# Patient Record
Sex: FEMALE | Race: WHITE | NOT HISPANIC OR LATINO | Employment: PART TIME | URBAN - METROPOLITAN AREA
[De-identification: names, ages, dates, MRNs, and addresses within clinical notes are randomized per-mention and may not be internally consistent; named-entity substitution may affect disease eponyms.]

---

## 2022-01-19 ENCOUNTER — TRANSFERRED RECORDS (OUTPATIENT)
Dept: HEALTH INFORMATION MANAGEMENT | Facility: CLINIC | Age: 32
End: 2022-01-19
Payer: COMMERCIAL

## 2022-01-25 ENCOUNTER — TRANSFERRED RECORDS (OUTPATIENT)
Dept: HEALTH INFORMATION MANAGEMENT | Facility: CLINIC | Age: 32
End: 2022-01-25
Payer: COMMERCIAL

## 2022-01-28 ENCOUNTER — TRANSCRIBE ORDERS (OUTPATIENT)
Dept: MATERNAL FETAL MEDICINE | Facility: CLINIC | Age: 32
End: 2022-01-28

## 2022-01-28 DIAGNOSIS — O26.90 PREGNANCY RELATED CONDITION, ANTEPARTUM: Primary | ICD-10-CM

## 2022-01-31 ENCOUNTER — PRE VISIT (OUTPATIENT)
Dept: MATERNAL FETAL MEDICINE | Facility: CLINIC | Age: 32
End: 2022-01-31
Payer: COMMERCIAL

## 2022-02-01 ENCOUNTER — HOSPITAL ENCOUNTER (OUTPATIENT)
Dept: ULTRASOUND IMAGING | Facility: CLINIC | Age: 32
End: 2022-02-01
Payer: COMMERCIAL

## 2022-02-01 ENCOUNTER — OFFICE VISIT (OUTPATIENT)
Dept: MATERNAL FETAL MEDICINE | Facility: CLINIC | Age: 32
End: 2022-02-01
Payer: COMMERCIAL

## 2022-02-01 DIAGNOSIS — O44.10: ICD-10-CM

## 2022-02-01 DIAGNOSIS — O26.90 PREGNANCY RELATED CONDITION, ANTEPARTUM: ICD-10-CM

## 2022-02-01 DIAGNOSIS — O43.212 PLACENTA ACCRETA IN SECOND TRIMESTER: Primary | ICD-10-CM

## 2022-02-01 PROCEDURE — 76817 TRANSVAGINAL US OBSTETRIC: CPT | Mod: 26 | Performed by: OBSTETRICS & GYNECOLOGY

## 2022-02-01 PROCEDURE — 76811 OB US DETAILED SNGL FETUS: CPT

## 2022-02-01 PROCEDURE — 76811 OB US DETAILED SNGL FETUS: CPT | Mod: 26 | Performed by: OBSTETRICS & GYNECOLOGY

## 2022-02-01 PROCEDURE — 99204 OFFICE O/P NEW MOD 45 MIN: CPT | Mod: 25 | Performed by: OBSTETRICS & GYNECOLOGY

## 2022-02-01 NOTE — PROGRESS NOTES
The patient was seen for an ultrasound in the M[facility]etal [facility] at the [facility] today.  For a detailed report of the ultrasound examination, please see the ultrasound report which can be found under the imaging tab.    Corazon Hook MD  , OB/GYN  Maternal-Fetal Medicine  [phone] (Pager)

## 2022-02-01 NOTE — NURSING NOTE
RN coordinator introduced self and provided contact information for patient. Plan for ASHOK at approximately 28 weeks.     Madalyn Bermudez RN

## 2022-02-09 ENCOUNTER — HOSPITAL ENCOUNTER (OUTPATIENT)
Dept: ULTRASOUND IMAGING | Facility: CLINIC | Age: 32
End: 2022-02-09
Attending: OBSTETRICS & GYNECOLOGY
Payer: COMMERCIAL

## 2022-02-09 ENCOUNTER — TRANSCRIBE ORDERS (OUTPATIENT)
Dept: MATERNAL FETAL MEDICINE | Facility: CLINIC | Age: 32
End: 2022-02-09

## 2022-02-09 ENCOUNTER — OFFICE VISIT (OUTPATIENT)
Dept: MATERNAL FETAL MEDICINE | Facility: CLINIC | Age: 32
End: 2022-02-09
Attending: OBSTETRICS & GYNECOLOGY
Payer: COMMERCIAL

## 2022-02-09 DIAGNOSIS — O43.212 PLACENTA ACCRETA IN SECOND TRIMESTER: Primary | ICD-10-CM

## 2022-02-09 DIAGNOSIS — O44.10: ICD-10-CM

## 2022-02-09 PROCEDURE — 76817 TRANSVAGINAL US OBSTETRIC: CPT | Mod: 26 | Performed by: OBSTETRICS & GYNECOLOGY

## 2022-02-09 PROCEDURE — 76817 TRANSVAGINAL US OBSTETRIC: CPT

## 2022-02-09 NOTE — NURSING NOTE
Bhumi here with her . Dr. Glover in to see pt.   Pt to have f/u comp and 1st obv on 3/3. Pt scheduled and left amb and stable. Madalyn Trent RN

## 2022-02-16 ENCOUNTER — TRANSFERRED RECORDS (OUTPATIENT)
Dept: HEALTH INFORMATION MANAGEMENT | Facility: CLINIC | Age: 32
End: 2022-02-16
Payer: COMMERCIAL

## 2022-02-21 NOTE — PROGRESS NOTES
"Please see \"Imaging\" tab under \"Chart Review\" for details of today's US at the [facility].    Nick Glover MD  Maternal-Fetal Medicine      "

## 2022-02-28 NOTE — PROGRESS NOTES
MFM OB INITIAL VISIT    Patient Information:   Bhumi Lorenzo   : 1990  MRN: [id number]    Consultation requested by: Naomi Rivera MD   Reason for consultation: Initiation of Prenatal Care      Presentation History:   Bhumi Lorenzo is a 32 year old  at 27w1d by 7w6d ultrasound presenting for her initial appointment with our practice.    The patient presents in good health - she has been feeling well.  No cramping/contractions, no LOF, no VB.  Reports good FM.  No headaches, vision changes, chest pain or shortness of breath, RUQ/epigastric pain, or worsening extremity edema. All other ROS negative.    Problem List:     There are no problems to display for this patient.    Review of Allergies/Medical History/Medications:     Obstetrical History:    OB History    Para Term  AB Living   5 3 3 0 1 3   SAB IAB Ectopic Multiple Live Births   1 0 0 0 3      # Outcome Date GA Lbr Travis/2nd Weight Sex Delivery Anes PTL Lv   5 Current            4 SAB      SAB      3 Term         ARNOLDO   2 Term         ARNOLDO   1 Term         ARNOLDO     Medical History:  No past medical history on file.    Surgical History:  No past surgical history on file.    Medications:   No current outpatient medications on file.     Allergies: NKDA      Social History:    She denies tobacco, alcohol, or other illicit drug use prior to or during pregnancy.     Family History:  No family history on file.    Ethnicity:      Denies a family history of motor/intellectual impairment, stillbirth, genetic or chromosome abnormalities or congenital anomalies.       Review of Systems:       Constitutional - denies fevers or chills, no recent illness.    Eyes - denies blurry vision, visual changes, eye pain.     ENT - denies sore throat and nasal congestion, no hearing problems.    Cardiovascular - denies shortness of breath, palpitations or chest pain.    Respiratory - denies wheezing, no cough, no difficulty  breathing.    Gastrointestinal - normal appetite, denies diarrhea, rectal bleeding.    Genitourinary - denies dysuria, no hematuria.    Musculoskeletal - denies joint pain, no muscle pain.    Dermatologic -  denies rashes, lesions, or dry skin.     Neurologic - denies headache, no dizziness, no seizures.    Psychiatric - denies changes in mood.      Endocrine - denies temperature sensitivity, polydipsia.    Hematologic/Lymphatic - denies excessive bleeding or bruising, no history of DVT.    Physical Exam:     Vitals:  There were no vitals taken for this visit.    GEN: NAD  SKIN: no rash appreciated  BREASTS: no masses, nipple discharge or skin changes  CV: regular, physiologic flow murmur  PULM: clear  ABD: gravid, nontender  EXT: wwp, non-tender, symmetric    Review of Labs/Diagnostics:     Comprehensive Growth US (2/1/2022):   IMPRESSION  ---------------------------------------------------------------------------------------------------------  1. David intrauterine pregnancy at 22w6d gestational age here for evaluation of fetal anatomy and the placenta due to suspected PAS on outside ultrasound.  2. No fetal anomalies commonly detected by ultrasound or soft markers of aneuploidy were identified in the detailed fetal anatomic survey within the limits of prenatal  ultrasound.  3. Growth parameters and estimated fetal weight were consistent with established dates.  4. The amniotic fluid volume appeared normal.  5. On transvaginal imaging the cervix appears closed but is only measuring 29mm in length.  6. The placenta is anterior with a complete placenta previa. 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    Transvaginal US (2/9/22):   Cardiac activity present.  bpm.  Fetal movements visualized.  Presentation  cephalic.  Placenta Posterior, previa.  Umbilical cord normal.  Amniotic fluid Amount of AF: normal. MVP 5.7 cm.  Cervix: Visualized. Appearance: Appears closed. Approach - Transvaginal: Cervical length 25.2 mm. Funneling absent. Cervical cerclage absent.     Please see supervision of high risk pregnancy under problem list for prenatal labs and ultrasound.       Assessment and Plan:   Buhmi Lorenzo is a 32 year old  at 26w5d here for initial prenatal appointment with our practice. Pregnancy is complicated by a history of  section x3, resolved placenta previa with concern for PAS, moderate mitral valve prolapse with regurgitant flow, and COVID in pregnancy.     Placenta Accreta Spectrum   Placenta Previa   - Complete placenta previa noted on comprehensive US performed 22 at 22w1d. Follow-up transvaginal ultrasound on 22 with previa and normal  Cervix length.  - US on  also showed multiple vascular lacunae within the placenta with loss of the hypoechoic zone between the placenta and myometrium. There was also increased subplacental vascularity. 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   - Comprehensive US performed again today, imaging reviewed. Ongoing findings concerning for PAS with multiple large sonolucent spaces with increased bladder edge vascularity.   - Given history of three prior  section, complete previa and PAS features recommendation at this time is to proceed with planned  hysterectomy at 34-35w. Patient states she is satisfied with child-bearing and agreeable with c-hyst. Again discussed with the patient the higher relative risk of including maternofetal compromise, need for transfusion, DIC, bladder and bowel injury.   - Plan for  hysterectomy at 34w GA with vertical abdominal incsion  - Anaesthesia consult at 31-32 weeks.  - Betamethasone with & days of planned delivery    Moderate Mitral Valve Prolapse with Regurgitation   - ECHO 2014 with normal LV  function, EF 60%; mild anterior mitral valve prolapse with mild-to-moderate eccentric mitral regurgitation. Mild TR.   - Per Modified WHO Classification categories, mitral valve prolapse falls within the Class I conditions - these are associated with no detectible increased risk of maternal mortality and no/mild increase in morbidity (Brisa et al., 2018). Mild to moderate mitral regurgitation is well tolerated in pregnancy, while severe mitral regurgitation can be tolerated in pregnancy if there is no left ventricular systolic dysfunction or pulmonary hypertension.   - No specific labor and delivery management necessitated - given low risk cardiac lesion, use of analgesia/anesthesia and Valsalva during delivery not contraindicated.   - Anesthesia consult in 4w.     Relative Shortening of Cervical Length   - Noted to be 25.5mm at 24w0d (22) - closed appearing, no funneling noted at the time.     Routine Prenatal Care   - Rh positive - antibody negative, HIV/RPR/HepB/C non-reactive.   - Pap 10/2021 NILM, HPV negative. Third trimester lab, GCT normal. Tdap given today. Declined flu, COVID series.  - Tdap today  - Declined influenza and COVID  vaccinations     Return to clinic in 4 weeks for ongoing prenatal care. Patient care plan discussed with Dr. Glover.     Kristin Reveles MD   OB/GYN PGY-2  Maternal Fetal Medicine Service   22 11:12 AM    Physician Attestation   I, Nick Glover MD, saw this patient and agree with the findings and plan of care as documented in the note.      Items personally reviewed/procedural attestation: History and extensive counseling on planned  hysterectomy.    Nick Glover MD      Total time spent in all patient care activities was 30 minutes.    Nick Glover MD  Maternal-Fetal Medicine

## 2022-03-03 ENCOUNTER — OFFICE VISIT (OUTPATIENT)
Dept: MATERNAL FETAL MEDICINE | Facility: CLINIC | Age: 32
End: 2022-03-03
Attending: OBSTETRICS & GYNECOLOGY
Payer: COMMERCIAL

## 2022-03-03 ENCOUNTER — HOSPITAL ENCOUNTER (OUTPATIENT)
Dept: ULTRASOUND IMAGING | Facility: CLINIC | Age: 32
End: 2022-03-03
Attending: OBSTETRICS & GYNECOLOGY
Payer: COMMERCIAL

## 2022-03-03 VITALS
WEIGHT: 166.4 LBS | SYSTOLIC BLOOD PRESSURE: 98 MMHG | OXYGEN SATURATION: 100 % | HEART RATE: 86 BPM | DIASTOLIC BLOOD PRESSURE: 53 MMHG

## 2022-03-03 DIAGNOSIS — O44.10: ICD-10-CM

## 2022-03-03 DIAGNOSIS — O43.212 PLACENTA ACCRETA IN SECOND TRIMESTER: Primary | ICD-10-CM

## 2022-03-03 DIAGNOSIS — Z3A.27 27 WEEKS GESTATION OF PREGNANCY: ICD-10-CM

## 2022-03-03 PROCEDURE — 90471 IMMUNIZATION ADMIN: CPT

## 2022-03-03 PROCEDURE — 76816 OB US FOLLOW-UP PER FETUS: CPT

## 2022-03-03 PROCEDURE — 76816 OB US FOLLOW-UP PER FETUS: CPT | Mod: 26 | Performed by: OBSTETRICS & GYNECOLOGY

## 2022-03-03 PROCEDURE — 250N000011 HC RX IP 250 OP 636

## 2022-03-03 PROCEDURE — 90715 TDAP VACCINE 7 YRS/> IM: CPT

## 2022-03-03 PROCEDURE — 99214 OFFICE O/P EST MOD 30 MIN: CPT | Mod: 25 | Performed by: OBSTETRICS & GYNECOLOGY

## 2022-03-03 ASSESSMENT — PAIN SCALES - GENERAL: PAINLEVEL: NO PAIN (0)

## 2022-03-03 NOTE — NURSING NOTE
Bhumi here with  for f/u comp and 1st OBV due to preg c/b placenta accreta spectrum disorder, short cervix and mod mitral valve regurgitation.  Dr. Glover and Dr. Reveles in to talk with pt. Pt received Tdap today. Pt to come back in 4 weeks for f/u comp and f/u obv with SCOTTY ARSHAD. Pt schedule anesthesia consult for 1:30 on 3/31. Pt to have C/Hyst set up for 34-35 weeks. Pt also to have Care Conference set up.    Pt left amb and stable. Madalyn Trent RN

## 2022-03-04 NOTE — TELEPHONE ENCOUNTER
FUTURE VISIT INFORMATION      SURGERY INFORMATION:    Risk eval// RECS in epic// appt per maternal clinic    Consult: ov 3/3    RECORDS REQUESTED FROM:       Most recent EKG+ Tracin22- [facility]    Most recent ECHO: 14- [facility]

## 2022-03-09 ENCOUNTER — HOSPITAL ENCOUNTER (INPATIENT)
Facility: CLINIC | Age: 32
Setting detail: SURGERY ADMIT
End: 2022-03-09
Attending: OBSTETRICS & GYNECOLOGY | Admitting: OBSTETRICS & GYNECOLOGY
Payer: COMMERCIAL

## 2022-03-09 ENCOUNTER — TELEPHONE (OUTPATIENT)
Dept: MATERNAL FETAL MEDICINE | Facility: CLINIC | Age: 32
End: 2022-03-09
Payer: COMMERCIAL

## 2022-03-09 DIAGNOSIS — O43.219 PLACENTA ACCRETA AFFECTING DELIVERY: Primary | ICD-10-CM

## 2022-03-09 NOTE — TELEPHONE ENCOUNTER
Writer called and left voicemail for pt to call to discuss date and time of C/Hyst. Procedure scheduled for 4/25 at 7:30 am. Madalyn Trent RN

## 2022-03-10 ENCOUNTER — PREP FOR PROCEDURE (OUTPATIENT)
Dept: MATERNAL FETAL MEDICINE | Facility: CLINIC | Age: 32
End: 2022-03-10
Payer: COMMERCIAL

## 2022-03-10 DIAGNOSIS — O43.219 PLACENTA ACCRETA AFFECTING DELIVERY: Primary | ICD-10-CM

## 2022-03-10 RX ORDER — SODIUM CHLORIDE, SODIUM LACTATE, POTASSIUM CHLORIDE, CALCIUM CHLORIDE 600; 310; 30; 20 MG/100ML; MG/100ML; MG/100ML; MG/100ML
INJECTION, SOLUTION INTRAVENOUS CONTINUOUS
Status: CANCELLED | OUTPATIENT
Start: 2022-03-10

## 2022-03-10 RX ORDER — CEFAZOLIN SODIUM 2 G/100ML
2 INJECTION, SOLUTION INTRAVENOUS SEE ADMIN INSTRUCTIONS
Status: CANCELLED | OUTPATIENT
Start: 2022-03-10

## 2022-03-10 RX ORDER — METHYLERGONOVINE MALEATE 0.2 MG/ML
200 INJECTION INTRAVENOUS
Status: CANCELLED | OUTPATIENT
Start: 2022-03-10

## 2022-03-10 RX ORDER — LIDOCAINE 40 MG/G
CREAM TOPICAL
Status: CANCELLED | OUTPATIENT
Start: 2022-03-10

## 2022-03-10 RX ORDER — OXYTOCIN/0.9 % SODIUM CHLORIDE 30/500 ML
340 PLASTIC BAG, INJECTION (ML) INTRAVENOUS CONTINUOUS PRN
Status: CANCELLED | OUTPATIENT
Start: 2022-03-10

## 2022-03-10 RX ORDER — MISOPROSTOL 200 UG/1
400 TABLET ORAL
Status: CANCELLED | OUTPATIENT
Start: 2022-03-10

## 2022-03-10 RX ORDER — OXYTOCIN 10 [USP'U]/ML
10 INJECTION, SOLUTION INTRAMUSCULAR; INTRAVENOUS
Status: CANCELLED | OUTPATIENT
Start: 2022-03-10

## 2022-03-10 RX ORDER — CITRIC ACID/SODIUM CITRATE 334-500MG
30 SOLUTION, ORAL ORAL
Status: CANCELLED | OUTPATIENT
Start: 2022-03-10

## 2022-03-10 RX ORDER — ACETAMINOPHEN 325 MG/1
975 TABLET ORAL ONCE
Status: CANCELLED | OUTPATIENT
Start: 2022-03-10 | End: 2022-03-10

## 2022-03-10 RX ORDER — TRANEXAMIC ACID 10 MG/ML
1 INJECTION, SOLUTION INTRAVENOUS EVERY 30 MIN PRN
Status: CANCELLED | OUTPATIENT
Start: 2022-03-10

## 2022-03-10 RX ORDER — CEFAZOLIN SODIUM 2 G/100ML
2 INJECTION, SOLUTION INTRAVENOUS
Status: CANCELLED | OUTPATIENT
Start: 2022-03-10

## 2022-03-10 RX ORDER — CARBOPROST TROMETHAMINE 250 UG/ML
250 INJECTION, SOLUTION INTRAMUSCULAR
Status: CANCELLED | OUTPATIENT
Start: 2022-03-10

## 2022-03-10 RX ORDER — MISOPROSTOL 200 UG/1
800 TABLET ORAL
Status: CANCELLED | OUTPATIENT
Start: 2022-03-10

## 2022-03-10 RX ORDER — OXYTOCIN/0.9 % SODIUM CHLORIDE 30/500 ML
100-340 PLASTIC BAG, INJECTION (ML) INTRAVENOUS CONTINUOUS PRN
Status: CANCELLED | OUTPATIENT
Start: 2022-03-10

## 2022-03-12 ENCOUNTER — HEALTH MAINTENANCE LETTER (OUTPATIENT)
Age: 32
End: 2022-03-12

## 2022-03-12 DIAGNOSIS — Z11.59 ENCOUNTER FOR SCREENING FOR OTHER VIRAL DISEASES: Primary | ICD-10-CM

## 2022-03-31 ENCOUNTER — OFFICE VISIT (OUTPATIENT)
Dept: SURGERY | Facility: CLINIC | Age: 32
End: 2022-03-31
Payer: COMMERCIAL

## 2022-03-31 ENCOUNTER — PRE VISIT (OUTPATIENT)
Dept: SURGERY | Facility: CLINIC | Age: 32
End: 2022-03-31

## 2022-03-31 ENCOUNTER — ANESTHESIA EVENT (OUTPATIENT)
Dept: SURGERY | Facility: CLINIC | Age: 32
End: 2022-03-31

## 2022-03-31 ENCOUNTER — HOSPITAL ENCOUNTER (OUTPATIENT)
Dept: ULTRASOUND IMAGING | Facility: CLINIC | Age: 32
Discharge: HOME | End: 2022-03-31
Attending: OBSTETRICS & GYNECOLOGY
Payer: COMMERCIAL

## 2022-03-31 ENCOUNTER — OFFICE VISIT (OUTPATIENT)
Dept: MATERNAL FETAL MEDICINE | Facility: CLINIC | Age: 32
End: 2022-03-31
Attending: OBSTETRICS & GYNECOLOGY
Payer: COMMERCIAL

## 2022-03-31 VITALS
OXYGEN SATURATION: 100 % | WEIGHT: 173.7 LBS | DIASTOLIC BLOOD PRESSURE: 65 MMHG | TEMPERATURE: 97.3 F | SYSTOLIC BLOOD PRESSURE: 108 MMHG | HEART RATE: 109 BPM | BODY MASS INDEX: 27.92 KG/M2 | HEIGHT: 66 IN | RESPIRATION RATE: 16 BRPM

## 2022-03-31 VITALS
RESPIRATION RATE: 20 BRPM | SYSTOLIC BLOOD PRESSURE: 112 MMHG | HEART RATE: 85 BPM | OXYGEN SATURATION: 100 % | DIASTOLIC BLOOD PRESSURE: 50 MMHG | BODY MASS INDEX: 27.57 KG/M2 | WEIGHT: 170.8 LBS

## 2022-03-31 DIAGNOSIS — O43.212 PLACENTA ACCRETA IN SECOND TRIMESTER: ICD-10-CM

## 2022-03-31 PROCEDURE — 99212 OFFICE O/P EST SF 10 MIN: CPT | Mod: 25 | Performed by: OBSTETRICS & GYNECOLOGY

## 2022-03-31 PROCEDURE — G0463 HOSPITAL OUTPT CLINIC VISIT: HCPCS | Mod: 25

## 2022-03-31 PROCEDURE — 99204 OFFICE O/P NEW MOD 45 MIN: CPT | Performed by: PHYSICIAN ASSISTANT

## 2022-03-31 PROCEDURE — 76816 OB US FOLLOW-UP PER FETUS: CPT

## 2022-03-31 PROCEDURE — 76816 OB US FOLLOW-UP PER FETUS: CPT | Mod: 26 | Performed by: OBSTETRICS & GYNECOLOGY

## 2022-03-31 ASSESSMENT — PAIN SCALES - GENERAL
PAINLEVEL: NO PAIN (0)
PAINLEVEL: NO PAIN (0)

## 2022-03-31 ASSESSMENT — ENCOUNTER SYMPTOMS: SEIZURES: 0

## 2022-03-31 ASSESSMENT — LIFESTYLE VARIABLES: TOBACCO_USE: 0

## 2022-03-31 NOTE — NURSING NOTE
Bhumi is here for f/u comp and f/u obv due to preg c/b placenta Accreta and mod mitral valve regurgitation. Pt reports +FM, denies ctx, denies SRoM, and denies vag bleeding.  Bhumi went to anesthesia over on the [facility] for a consult today. Dr. Wilkerson in to see pt.  Bhumi to have Beta in [place] on 4/21 and Beta at [facility] on 4/22 with Covid test and labs. Pt to have ob apt at [facility] in 2 weeks.  Pt left amb and stable. Madalyn Trent, RN

## 2022-03-31 NOTE — CONSULTS
Anesthesia Consult Note      Reason for consult:   High Risk OB consult  (O43.212) Placenta accreta in second trimester  Comment: Given the patient's history of placenta accreta, she is considered a high-risk OB patient.   Plan: Hysterectomy following  on 22      Date of Encounter: 3/31/2022  Referring Physician: Nick Glover MD  Primary Care Physician:  No primary care provider on file.      JAMESON Lorenzo is a 32 year old woman who is currently  who is currently 31w1d gestation. She is being seen in our clinic for high risk OB consult due to placenta accreta. The patient is otherwise healthy.  OB Hx:       /parity:      History of complications of pregnancy  No previous pregnancy complications or first pregnancy    History of obstetrical surgery  Previous  x3    History of bleeding/coagulopathy  denies    History of anesthesia issues in patient or 1st degree relative  No previous issues with anesthesia for the patient or a first degree relative    History is obtained from the patient and chart review.    Past Medical History  Past Medical History:   Diagnosis Date     Placenta accreta       Unremarkable    Past Surgical History  Past Surgical History:   Procedure Laterality Date      SECTION      x3     ME BREAST AUGMENTATION  2016     TONSILLECTOMY         Prior to Admission Medications  Current Outpatient Medications   Medication Sig Dispense Refill     Prenatal Vit-Fe Fumarate-FA (PRENATAL VITAMIN PO) Take 1 tablet by mouth daily         Menstrual history: No LMP recorded. Patient is pregnant.:       Allergies  No Known Allergies    Social History  Social History     Socioeconomic History     Marital status: Single     Spouse name: Not on file     Number of children: Not on file     Years of education: Not on file     Highest education level: Not on file   Occupational History     Not on file   Tobacco Use     Smoking status: Never Smoker      "Smokeless tobacco: Never Used   Substance and Sexual Activity     Alcohol use: Not on file     Drug use: Not on file     Sexual activity: Not on file   Other Topics Concern     Not on file   Social History Narrative     Not on file     Social Determinants of Health     Financial Resource Strain: Not on file   Food Insecurity: Not on file   Transportation Needs: Not on file   Physical Activity: Not on file   Stress: Not on file   Social Connections: Not on file   Intimate Partner Violence: Not on file   Housing Stability: Not on file       Family History  Family History   Problem Relation Age of Onset     Anesthesia Reaction No family hx of      Cardiovascular No family hx of      Deep Vein Thrombosis No family hx of        The complete review of systems is negative other than noted in the HPI or here.     Anesthesia Evaluation   Pt has had prior anesthetic.     No history of anesthetic complications       ROS/MED HX  ENT/Pulmonary:  - neg pulmonary ROS  (-) tobacco use, asthma and sleep apnea   Neurologic:  - neg neurologic ROS  (-) no seizures and no CVA   Cardiovascular: Comment: Palpitations. Pt has appt w/ cardiology in upcoming weeks      METS/Exercise Tolerance: >4 METS    Hematologic:  - neg hematologic  ROS  (-) history of blood clots and history of blood transfusion   Musculoskeletal:  - neg musculoskeletal ROS     GI/Hepatic:  - neg GI/hepatic ROS  (-) GERD and liver disease   Renal/Genitourinary:  - neg Renal ROS     Endo:  - neg endo ROS  (-) Type II DM   Psychiatric/Substance Use:  - neg psychiatric ROS     Infectious Disease:  - neg infectious disease ROS     Malignancy:  - neg malignancy ROS     Other: Comment: Currently 31w1d gestation, c/b placenta accreta               /65 (BP Location: Left arm, Patient Position: Sitting, Cuff Size: Adult Regular)   Pulse 109   Temp 97.3  F (36.3  C) (Oral)   Resp 16   Ht 1.676 m (5' 6\")   Wt 78.8 kg (173 lb 11.2 oz)   SpO2 100%   Breastfeeding No   " BMI 28.04 kg/m      Physical Exam  Constitutional: Awake, alert, cooperative, no apparent distress, and appears stated age.  Eyes: Pupils equal, round and reactive to light, extra ocular muscles intact, sclera clear, conjunctiva normal.  HENT: Normocephalic, oral pharynx with moist mucus membranes, good dentition. No goiter appreciated. Upper & lower permanent retainers in place.  Respiratory: Clear to auscultation bilaterally, no crackles or wheezing.  Cardiovascular: Regular rate and rhythm, normal S1 and S2, and no murmur noted.  Carotids +2, no bruits. No edema. Palpable pulses to radial  DP and PT arteries.   GI: Consistent with 31w gestation.    Lymph/Hematologic: No cervical lymphadenopathy and no supraclavicular lymphadenopathy.  Genitourinary:  deferred  Skin: Warm and dry.    Musculoskeletal: Full ROM of neck. There is no redness, warmth, or swelling of the joints. Gross motor strength is normal.   Neurologic: Awake, alert, oriented to name, place and time. Cranial nerves II-XII are grossly intact. Gait is normal.   Neuropsychiatric: Calm, cooperative. Bright affect. Pleasant.     Labs / testing: (personally reviewed)  WBC Count, Corrected 4.0 - 11.0 10(3)/uL 8.9    RBC Count 3.90 - 5.20 10(6)/uL 4.05    Hemoglobin 11.8 - 15.8 g/dL 13.1    Hematocrit 35.0 - 45.0 % 38.2    MCV 81.0 - 99.0 fL 94.2    MCH 28.0 - 33.0 pg 32.4    MCHC 32.0 - 36.0 g/dL 34.4    RDW 11.5 - 16.0 % 13.9    Platelets 130 - 450 10(3)/uL 229    MPV 6.5 - 11.0 fL 7.6    [facility]  [facility]   Specimen Collected: 02/16/22           Assessment      Bhumi Lorenzo is a 32 year old female seen as a PAC referral for risk assessment and optimization for anesthesia.    Plan/Recommendations  Pt will be optimized for the proposed procedure.  See below for details on the assessment, risk, and preoperative recommendations    NEUROLOGY  - No history of TIA, CVA or seizure    ENT  - No current airway concerns.  Will need  "to be reassessed day of surgery.  Mallampati: I  TM: > 3    CARDIAC  - No history of CAD, Hypertension and Afib   - Pt has been experiencing palpitations. She has a cardiology appt in 1-2 weeks.    - METS (Metabolic Equivalents)  Patient performs 4 or more METS exercise without symptoms            Total Score: 0      RCRI-Low risk: Class 2 0.9% complication rate            Total Score: 1    RCRI: High Risk Surgery        PULMONARY  SUZY Low Risk            Total Score: 0      - Denies asthma or inhaler use  - Tobacco History      History   Smoking Status     Never Smoker   Smokeless Tobacco     Never Used       GI  - Has pregnancy-associated GERD. No hx prior.  PONV High Risk  Total Score: 3           1 AN PONV: Pt is Female    1 AN PONV: Patient is not a current smoker    1 AN PONV: Intended Post Op Opioids          ENDOCRINE   - BMI: Estimated body mass index is 28.04 kg/m  as calculated from the following:    Height as of this encounter: 1.676 m (5' 6\").    Weight as of this encounter: 78.8 kg (173 lb 11.2 oz).  Healthy Weight (BMI 18.5-24.9)  - No history of Diabetes Mellitus    HEME  VTE Low Risk 0.26%            Total Score: 0      - No history of abnormal bleeding or antiplatelet use.      MSK  Patient is NOT Frail            Total Score: 0            Tentative Obstetrical Anesthesia Treatment plan developed in collaboration with the attending anesthesiologist Dr. Wisdom from PAC and Dr. Weir from OB.    Scheduled  planned for 22 with spinal epidural, followed by hysterectomy w/ general anesthesia.         On the day of service:     Prep time: 12 minutes  Visit time: 20 minutes  Documentation/consultation time: 12 minutes  ------------------------------------------  Total time: 46 minutes    Mary Mcneill PA-C    Preoperative Assessment Center  [facility]  Office phone: [phone]  Fax: [phone]  "

## 2022-03-31 NOTE — PROGRESS NOTES
[facility] FOLLOW-UP PRENATAL VISIT    Patient Information:   Bhumi Lorenzo   : 1990  MRN: [id number]    Presentation History:   Bhumi Lorenzo is a 32 year old  at 31w1d by 7w6d ultrasound presenting for follow up prenatal care. 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     The patient presents today to clinic with her . Describes good health overall. Does note a recent incident of tachycardia on Tuesday 3/29/21 when she was awoken from her sleep to the sensation of palpitations. Per her watch, her HR was 170-180s for approximately one hour. The patient rested, and the tachycardia spontaneously remitted thereafter. No associated chest pain, shortness of breath, nausea/vomiting. Of note, the patient had a similar episode (albeit shorter in duration) in  at which time she was evaluated by a primary care doctor - EKG and subsequent Holter monitoring performed, with the latter showing occasionally PVCs. No subsequent episodes since this this week.     Today she feels well. No cramping/contractions, leaking of fluids, or vaginal bleeding. Notes baseline fetal movement. No headaches, vision changes, chest pain or shortness of breath, RUQ/epigastric pain, or worsening extremity edema. All other ROS negative.    Review of Allergies/Medical History/Medications:     Obstetrical History:    OB History    Para Term  AB Living   5 3 3 0 1 3   SAB IAB Ectopic Multiple Live Births   1 0 0 0 3      # Outcome Date GA Lbr Travis/2nd Weight Sex Delivery Anes PTL Lv   5 Current            4 SAB      SAB      3 Term         ARNOLDO   2 Term         ARNOLDO   1 Term         ARNOLDO     Medical History:  Past Medical History:   Diagnosis Date     Placenta accreta        Surgical History:  - Hx LTCS x3    Medications:     Current Outpatient Medications:      Prenatal Vit-Fe Fumarate-FA (PRENATAL VITAMIN PO), Take 1 tablet by mouth daily, Disp: , Rfl:       Allergies: NKDA      Review of Systems:   All other ROS negative     Physical Exam:     Vitals:  /50   Pulse 85   Resp 20   Wt 77.5 kg (170 lb 12.8 oz)   SpO2 100%   BMI 27.57 kg/m    Gen: Well appearing, no distress      Review of Labs/Diagnostics:     Comprehensive Growth US (2022):   IMPRESSION  ---------------------------------------------------------------------------------------------------------  1. David intrauterine pregnancy at 22w6d gestational age here for evaluation of fetal anatomy and the placenta due to suspected PAS on outside ultrasound.  2. No fetal anomalies commonly detected by ultrasound or soft markers of aneuploidy were identified in the detailed fetal anatomic survey within the limits of prenatal  ultrasound.  3. Growth parameters and estimated fetal weight were consistent with established dates.  4. The amniotic fluid volume appeared normal.  5. On transvaginal imaging the cervix appears closed but is only measuring 29mm in length.  6. The placenta is anterior with a complete placenta previa. 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    Transvaginal US (22):   Cardiac activity present.  bpm.  Fetal movements visualized.  Presentation cephalic.  Placenta Posterior, previa.  Umbilical cord normal.  Amniotic fluid Amount of AF: normal. MVP 5.7 cm.  Cervix: Visualized. Appearance: Appears closed. Approach - Transvaginal: Cervical length 25.2 mm. Funneling absent. Cervical cerclage absent.     Please see supervision of high risk pregnancy under problem list for prenatal labs and ultrasound.       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 Pregnancy is complicated by a history of  section x3, moderate mitral valve prolapse with regurgitant flow, and COVID in pregnancy.     Placenta Accreta Spectrum   Persistent Placenta Previa   - Complete placenta previa noted on transabdominal and transvaginal US performed 22, 22, 3/3/22 and again on Comprehensive US today. Most recent transvaginal US on  also showed multiple vascular lacunae within the placenta with loss of the hypoechoic zone between the placenta and myometrium. There was also increased subplacental vascularity. 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 Given history of three prior  section, complete previa and PAS features recommendation was made to undergo planned  hysterectomy at 34-35w at [facility]. Patient previously stated she is satisfied with child-bearing and agreeable with c-hyst. Previously discussed with the patient the higher relative risk of including maternofetal compromise, need for transfusion, DIC, bladder and bowel injury.   - Plan for  hysterectomy at 34w gestation with vertical abdominal incision - scheduled on 22 at [facility] with Dr. Richey. Gynecology Oncology service (Dr. Salgado) to be present as well.   - Anesthesia consult at 31-32 weeks - to be done today.   - Betamethasone within one week of delivery - plan on receiving the first dose on  in [place], [place] near here with subsequent dose on  at [facility] with co-current preoperative lab work.     Episodic Tachycardia   Moderate Mitral Valve Prolapse with Regurgitation   - ECHO 2014 with normal LV function, EF 60%; mild anterior mitral valve prolapse with mild-to-moderate eccentric mitral regurgitation. Mild TR.   - Per Modified WHO Classification categories, mitral valve prolapse falls within the Class I conditions - these are associated with no detectible increased risk of maternal mortality and no/mild increase in morbidity  (Brisa et al., 2018). Mild to moderate mitral regurgitation is well tolerated in pregnancy, while severe mitral regurgitation can be tolerated in pregnancy if there is no left ventricular systolic dysfunction or pulmonary hypertension.  - Given new episodic tachycardia, suggest that patient follow up with Cardiology in the near future. She has not seen a Cardiologist in the past. Offered Cardiology consultation via [facility] but patient would prefer somewhere closer to where she lives. She will try to contact her primary OB/GYN today to set up an appointment more locally. We will reach out in the next several days to ensure she has follow up and offer Cardiology consult here again.     Routine Prenatal Care   - Rh positive - antibody negative, HIV/RPR/HepB/C non-reactive.   - Pap 10/2021 NILM, HPV negative. Third trimester lab, GCT normal. S/p Tdap. Declined flu, COVID series - can readdress the latter postpartum.    Return to clinic in 1 weeks for ongoing prenatal care. Return precautions discussed, including when to present to a local Emergency Department for cardiorespiratory or labor symptoms. Patient care plan and patient seen with Dr. Wilkerson.     Kristin Reveles MD   OB/GYN PGY-2  Maternal Fetal Medicine Service   03/31/22

## 2022-04-04 NOTE — TELEPHONE ENCOUNTER
RECORDS RECEIVED FROM:External, internal   DATE RECEIVED: 4.5.22   NOTES STATUS DETAILS   OFFICE NOTE from referring provider    Care Everywhere 4.4.22 Cande Rivera   OFFICE NOTE from other cardiologist        DISCHARGE SUMMARY from hospital        DISCHARGE REPORT from the ER       OPERATIVE REPORT        MEDICATION LIST   Internal    LABS     BMP       CBC   Internal 3.10.22   CMP       Lipids       TSH       DIAGNOSTIC PROCEDURES     EKG   Care Everywhere 2.16.22 [facility]   Monitor Reports       IMAGING (DISC & REPORT)      Echo       Stress Tests       Cath       MRI/MRA       CT/CTA         Action 4.4.22 MJ   Action Taken Requested EKG strips from [facility]     Action 4.5.22 MJ   Action Taken Sent EKG strips to [facility]

## 2022-04-05 ENCOUNTER — TELEPHONE (OUTPATIENT)
Dept: MATERNAL FETAL MEDICINE | Facility: CLINIC | Age: 32
End: 2022-04-05
Payer: COMMERCIAL

## 2022-04-05 ENCOUNTER — OFFICE VISIT (OUTPATIENT)
Dept: MATERNAL FETAL MEDICINE | Facility: CLINIC | Age: 32
End: 2022-04-05
Attending: ADVANCED PRACTICE MIDWIFE
Payer: COMMERCIAL

## 2022-04-05 ENCOUNTER — PRE VISIT (OUTPATIENT)
Dept: CARDIOLOGY | Facility: CLINIC | Age: 32
End: 2022-04-05
Payer: COMMERCIAL

## 2022-04-05 ENCOUNTER — OFFICE VISIT (OUTPATIENT)
Dept: CARDIOLOGY | Facility: CLINIC | Age: 32
End: 2022-04-05
Attending: INTERNAL MEDICINE
Payer: COMMERCIAL

## 2022-04-05 VITALS
RESPIRATION RATE: 20 BRPM | DIASTOLIC BLOOD PRESSURE: 60 MMHG | SYSTOLIC BLOOD PRESSURE: 101 MMHG | BODY MASS INDEX: 27.45 KG/M2 | HEART RATE: 102 BPM | OXYGEN SATURATION: 98 % | WEIGHT: 171.6 LBS

## 2022-04-05 VITALS
HEART RATE: 82 BPM | OXYGEN SATURATION: 100 % | BODY MASS INDEX: 27.34 KG/M2 | SYSTOLIC BLOOD PRESSURE: 122 MMHG | DIASTOLIC BLOOD PRESSURE: 70 MMHG | HEIGHT: 66 IN | WEIGHT: 170.1 LBS

## 2022-04-05 DIAGNOSIS — I34.1 MITRAL VALVE PROLAPSE: ICD-10-CM

## 2022-04-05 DIAGNOSIS — I34.0 NONRHEUMATIC MITRAL VALVE REGURGITATION: ICD-10-CM

## 2022-04-05 DIAGNOSIS — N89.8 VAGINAL DISCHARGE DURING PREGNANCY IN THIRD TRIMESTER: ICD-10-CM

## 2022-04-05 DIAGNOSIS — I47.10 SVT (SUPRAVENTRICULAR TACHYCARDIA) (H): Primary | ICD-10-CM

## 2022-04-05 DIAGNOSIS — O26.893 VAGINAL DISCHARGE DURING PREGNANCY IN THIRD TRIMESTER: ICD-10-CM

## 2022-04-05 DIAGNOSIS — O09.93 SUPERVISION OF HIGH RISK PREGNANCY IN THIRD TRIMESTER: Primary | ICD-10-CM

## 2022-04-05 DIAGNOSIS — O26.893 VAGINAL DISCHARGE DURING PREGNANCY IN THIRD TRIMESTER: Primary | ICD-10-CM

## 2022-04-05 DIAGNOSIS — N89.8 VAGINAL DISCHARGE DURING PREGNANCY IN THIRD TRIMESTER: Primary | ICD-10-CM

## 2022-04-05 LAB
CLUE CELLS: ABNORMAL
TRICHOMONAS, WET PREP: ABNORMAL
WBC'S/HIGH POWER FIELD, WET PREP: ABNORMAL
YEAST, WET PREP: ABNORMAL

## 2022-04-05 PROCEDURE — 87210 SMEAR WET MOUNT SALINE/INK: CPT | Performed by: ADVANCED PRACTICE MIDWIFE

## 2022-04-05 PROCEDURE — G0463 HOSPITAL OUTPT CLINIC VISIT: HCPCS

## 2022-04-05 PROCEDURE — 99204 OFFICE O/P NEW MOD 45 MIN: CPT | Performed by: INTERNAL MEDICINE

## 2022-04-05 PROCEDURE — 99213 OFFICE O/P EST LOW 20 MIN: CPT | Performed by: ADVANCED PRACTICE MIDWIFE

## 2022-04-05 ASSESSMENT — PAIN SCALES - GENERAL
PAINLEVEL: NO PAIN (0)
PAINLEVEL: NO PAIN (0)

## 2022-04-05 NOTE — PROGRESS NOTES
SUBJECTIVE:  Bhumi Lorenzo is a 32 year old female who presents for evaluation of mitral valve  prolapse and palpitations.  Patient is 32 weeks pregnant with her fourth child.  3 other pregnancies were normal.  Had no cardiac complaints during pregnancy.  No history of hypertension or toxemia of pregnancy.  Few days ago patient got up at 4:00 at night with palpitations.  Apart from fast heartbeat no other symptoms.  Lasted for 1 minute and subsided by itself.  Patient recorded this on her iWatch with a rate of 170 bpm.  Patient used to have intermittent palpitations in the past.  They used to last 5 to 10 minutes but current episode lasted for over 1 hour.  Planning for  and hysterectomy at 35 weeks of pregnancy.  Patient has no prior significant cardiac history.  No significant cardiac risk factors.  Family history is unremarkable.    Patient Active Problem List    Diagnosis Date Noted     Placenta accreta in second trimester 2022     Priority: Medium    .  Current Outpatient Medications   Medication Sig     Prenatal Vit-Fe Fumarate-FA (PRENATAL VITAMIN PO) Take 1 tablet by mouth daily     No current facility-administered medications for this visit.     Past Medical History:   Diagnosis Date     Placenta accreta      Past Surgical History:   Procedure Laterality Date      SECTION      x3     CT BREAST AUGMENTATION  2016     TONSILLECTOMY       No Known Allergies  Social History     Socioeconomic History     Marital status: Single     Spouse name: Not on file     Number of children: Not on file     Years of education: Not on file     Highest education level: Not on file   Occupational History     Not on file   Tobacco Use     Smoking status: Never Smoker     Smokeless tobacco: Never Used   Substance and Sexual Activity     Alcohol use: Not on file     Drug use: Not on file     Sexual activity: Not on file   Other Topics Concern     Not on file   Social History Narrative     Not on file  "    Social Determinants of Health     Financial Resource Strain: Not on file   Food Insecurity: Not on file   Transportation Needs: Not on file   Physical Activity: Not on file   Stress: Not on file   Social Connections: Not on file   Intimate Partner Violence: Not on file   Housing Stability: Not on file     Family History   Problem Relation Age of Onset     Anesthesia Reaction No family hx of      Cardiovascular No family hx of      Deep Vein Thrombosis No family hx of           REVIEW OF SYSTEMS:  General: negative, fever, chills, night sweats  Skin: negative, acne, rash and scaling  Eyes: negative, double vision, eye pain and photophobia  Ears/Nose/Throat: negative, nasal congestion and purulent rhinorrhea  Respiratory: No dyspnea on exertion, No cough, No hemoptysis and negative  Cardiovascular: negative, chest pain, exertional chest pain or pressure, paroxysmal nocturnal dyspnea, dyspnea on exertion and orthopnea       OBJECTIVE:  Blood pressure 122/70, pulse 82, height 1.684 m (5' 6.3\"), weight 77.2 kg (170 lb 1.6 oz), SpO2 100 %, not currently breastfeeding.  General Appearance: healthy, alert, active and no distress  Head: Normocephalic. No masses, lesions, tenderness or abnormalities  Eyes: conjuctiva clear, PERRL, EOM intact  Ears: External ears normal. Canals clear. TM's normal.  Nose: Nares normal  Mouth: normal  Neck: Supple, no cervical adenopathy, no thyromegaly  Lungs: clear to auscultation  Cardiac: regular rate and rhythm, normal S1 and S2, SM.       ASSESSMENT/PLAN:  Patient here for evaluation of mitral valve prolapse and palpitations.  Currently 32 weeks pregnant with fourth child.  No problems during previous pregnancies.  Planning for  and hysterectomy at 35 weeks of gestation.  Patient is very active with no cardiac symptoms.  Used to have palpitations in the past which he is to last 5 to 10 minutes.  Did not bother her much.  Few days ago she got up from sleep at 4:00 with a fast " heartbeat.  No associated symptoms specifically no chest pain shortness of breath dizziness or loss of consciousness.  Symptoms subsided by itself by rest.  Patient recorded this on her iWatch.  Rate was 170 bpm narrow complex tachycardia.  Probably SVT but details unclear.  Reviewed her records.  Patient had a recent EKG which showed normal sinus rhythm and normal.  Care everywhere reviewed.  No echocardiogram available.  Records indicate mitral valve prolapse with moderate to severe mitral regurgitation.  Discussed difficulty in treating arrhythmia during pregnancy.  Especially when she is asymptomatic and with self-limiting.  No medication is needed at this time.  Discussed vagal maneuvers to control SVT.  Now Zio patch ordered as patient cannot recall her tachycardia and send it through [employer].  Discussed that many treatment options are available after her .  This will be in another 3 weeks time.  We will plan for an echocardiogram to assess MVP and severity of mitral regurgitation.  Patient will be contacted with the test result.  Clinically MR do not suggest that significant.  It should not interfere with the planned  and hysterectomy.  Return to Clinic in 1 year with an echocardiogram.  Total visit duration 45 minutes.  This include face-to-face interview, physical exam, chart review, review of EKG, care everywhere and documentation.

## 2022-04-05 NOTE — NURSING NOTE
Chief Complaint   Patient presents with     New Patient     mitral valve regurgitation     Vitals were taken and medications were reconciled.   Low Greenberg, EMT  11:55 AM

## 2022-04-05 NOTE — NURSING NOTE
Bhumi here for f/u obv due to preg c/b vaginal discharge. Pt reports +FM, denies ctx, denies SRoM, and denies vag bleeding. Pt reported some discharge that began this morning. Pt reports having cardiology apt this morning.  Manisha LAM in to do wet prep and vaginal exam on pt.   Pt has apt in 1 week and left amb and stable. Pt will call for any further questions and/or concern. Madalyn Trent RN

## 2022-04-05 NOTE — TELEPHONE ENCOUNTER
Pt calling to report new onset of yellow mucousy vaginal discharge on wiping. Has happened 3 times in past day. Pt does not recall this happening in previous pregnancies. Otherwise asymptomatic- no itching, burning, cxn, bleeding, LOF. FM WNL. Is on her way to cardiology appt. Will have pt come for OB exam after cardiology for further evaluation.

## 2022-04-05 NOTE — LETTER
2022      RE: Bhumi Lorenzo  [id number] [address]       Dear Colleague,    Thank you for the opportunity to participate in the care of your patient, Bhumi Lorenzo, at the [facility] [place] [facility]. Please see a copy of my visit note below.       SUBJECTIVE:  Bhumi Lorenzo is a 32 year old female who presents for evaluation of mitral valve  prolapse and palpitations.  Patient is 32 weeks pregnant with her fourth child.  3 other pregnancies were normal.  Had no cardiac complaints during pregnancy.  No history of hypertension or toxemia of pregnancy.  Few days ago patient got up at 4:00 at night with palpitations.  Apart from fast heartbeat no other symptoms.  Lasted for 1 minute and subsided by itself.  Patient recorded this on her iWatch with a rate of 170 bpm.  Patient used to have intermittent palpitations in the past.  They used to last 5 to 10 minutes but current episode lasted for over 1 hour.  Planning for  and hysterectomy at 35 weeks of pregnancy.  Patient has no prior significant cardiac history.  No significant cardiac risk factors.  Family history is unremarkable.    Patient Active Problem List    Diagnosis Date Noted     Placenta accreta in second trimester 2022     Priority: Medium    .  Current Outpatient Medications   Medication Sig     Prenatal Vit-Fe Fumarate-FA (PRENATAL VITAMIN PO) Take 1 tablet by mouth daily     No current facility-administered medications for this visit.     Past Medical History:   Diagnosis Date     Placenta accreta      Past Surgical History:   Procedure Laterality Date      SECTION      x3     CO BREAST AUGMENTATION  2016     TONSILLECTOMY       No Known Allergies  Social History     Socioeconomic History     Marital status: Single     Spouse name: Not on file     Number of children: Not on file     Years of education: Not on file     Highest education  "level: Not on file   Occupational History     Not on file   Tobacco Use     Smoking status: Never Smoker     Smokeless tobacco: Never Used   Substance and Sexual Activity     Alcohol use: Not on file     Drug use: Not on file     Sexual activity: Not on file   Other Topics Concern     Not on file   Social History Narrative     Not on file     Social Determinants of Health     Financial Resource Strain: Not on file   Food Insecurity: Not on file   Transportation Needs: Not on file   Physical Activity: Not on file   Stress: Not on file   Social Connections: Not on file   Intimate Partner Violence: Not on file   Housing Stability: Not on file     Family History   Problem Relation Age of Onset     Anesthesia Reaction No family hx of      Cardiovascular No family hx of      Deep Vein Thrombosis No family hx of           REVIEW OF SYSTEMS:  General: negative, fever, chills, night sweats  Skin: negative, acne, rash and scaling  Eyes: negative, double vision, eye pain and photophobia  Ears/Nose/Throat: negative, nasal congestion and purulent rhinorrhea  Respiratory: No dyspnea on exertion, No cough, No hemoptysis and negative  Cardiovascular: negative, chest pain, exertional chest pain or pressure, paroxysmal nocturnal dyspnea, dyspnea on exertion and orthopnea       OBJECTIVE:  Blood pressure 122/70, pulse 82, height 1.684 m (5' 6.3\"), weight 77.2 kg (170 lb 1.6 oz), SpO2 100 %, not currently breastfeeding.  General Appearance: healthy, alert, active and no distress  Head: Normocephalic. No masses, lesions, tenderness or abnormalities  Eyes: conjuctiva clear, PERRL, EOM intact  Ears: External ears normal. Canals clear. TM's normal.  Nose: Nares normal  Mouth: normal  Neck: Supple, no cervical adenopathy, no thyromegaly  Lungs: clear to auscultation  Cardiac: regular rate and rhythm, normal S1 and S2, SM.       ASSESSMENT/PLAN:  Patient here for evaluation of mitral valve prolapse and palpitations.  Currently 32 weeks " pregnant with fourth child.  No problems during previous pregnancies.  Planning for  and hysterectomy at 35 weeks of gestation.  Patient is very active with no cardiac symptoms.  Used to have palpitations in the past which he is to last 5 to 10 minutes.  Did not bother her much.  Few days ago she got up from sleep at 4:00 with a fast heartbeat.  No associated symptoms specifically no chest pain shortness of breath dizziness or loss of consciousness.  Symptoms subsided by itself by rest.  Patient recorded this on her iWatch.  Rate was 170 bpm narrow complex tachycardia.  Probably SVT but details unclear.  Reviewed her records.  Patient had a recent EKG which showed normal sinus rhythm and normal.  Care everywhere reviewed.  No echocardiogram available.  Records indicate mitral valve prolapse with moderate to severe mitral regurgitation.  Discussed difficulty in treating arrhythmia during pregnancy.  Especially when she is asymptomatic and with self-limiting.  No medication is needed at this time.  Discussed vagal maneuvers to control SVT.  Now Zio patch ordered as patient cannot recall her tachycardia and send it through MyChart.  Discussed that many treatment options are available after her .  This will be in another 3 weeks time.  We will plan for an echocardiogram to assess MVP and severity of mitral regurgitation.  Patient will be contacted with the test result.  Clinically MR do not suggest that significant.  It should not interfere with the planned  and hysterectomy.  Return to Clinic in 1 year with an echocardiogram.  Total visit duration 45 minutes.  This include face-to-face interview, physical exam, chart review, review of EKG, care everywhere and documentation.      Please do not hesitate to contact me if you have any questions/concerns.     Sincerely,     JOSH Mendez MD

## 2022-04-05 NOTE — PROGRESS NOTES
"Maternal fetal Medicine OB Follow up visit.     Bhumi Lorenzo  : 1990  MRN: [id number]    CC: OB Follow-up    Subjective:  Bhumi Lorenzo is a 32 year old  at 31w6d presenting for a problem OB visit. This morning she experienced 3 episodes of unusual vaginal discharge. The discharge was thick mucous and yellow in color. Denies any vaginal pain, odor, or itching. Has not had intercourse since early pregnancy and tested negative for gonorrhea/chlamydia at that time, therefore declines testing today. Agreeable to speculum exam and wet prep. Patient denies regular, painful contractions, denies loss of fluid or vaginal bleeding. Reports fetal movement. Had a cardiology visit prior to OB visit today.       OB Hx:  OB History    Para Term  AB Living   5 3 3 0 1 3   SAB IAB Ectopic Multiple Live Births   1 0 0 0 3      # Outcome Date GA Lbr Travis/2nd Weight Sex Delivery Anes PTL Lv   5 Current            4 SAB      SAB      3 Term         ARNOLDO   2 Term         ARNOLDO   1 Term         ARNOLDO         Objective:  /60   Pulse 102   Resp 20   Wt 77.8 kg (171 lb 9.6 oz)   SpO2 98%   BMI 27.45 kg/m      Gen: alert, oriented, NAD  Skin: warm, dry, intact  Respiratory: breathing unlabored, no SOB  Abdominal: gravid, non-tender, fundus measuring 31.5cm, FHTs: 145  Pelvic: external genitalia WNL; SSE revealed both thin white discharge and thick yellow discharge in vaginal vault. No erythema or bleeding noted. Cervix is visually closed and thick.  Extremities: WNL  Psych: mood WNL, behavior WNL      OB Ultrasound:  Please see \"imaging\" tab under chart review for today's ultrasound results.      Assessment/Plan:  32 year old  at 31w6d here for follow OB visit.    Pregnancy has been complicated by:   - Complete previa, PAS  - c/s x3  - Shortened cervix   - Hx moderate mitral valve regurgitation and prolapse  - Palpitations this pregnancy        #Mitral valve regurgitation:  #Palpitations:  - " Cardiology visit today with recommendation for echo prior to her upcoming delivery, however their schedule was full until mid-May so their department is looking into . Recent episode of palpitations thought to be SVT and self-limiting.  - EKG on 2/16/22:   Vent. Rate : 098 BPM     Atrial Rate : 000 BPM      P-R Int : 150 ms          QRS Dur : 086 ms       QT Int : 338 ms       P-R-T Axes : 077 042 016 degrees      QTc Int : 394 ms   SINUS RHYTHM   NORMAL ECG   When compared with ECG of 11/10/14- no significant change other than rate   - Ziopatch ordered.     #Routine PNC:  - Prenatal labs:  Rh: +  antibody: neg   HepB/HIV/RPR: nonreactive   GC/CT: neg   Rubella: immune     GCT: pass   UC: no growth  - Immunizations:  s/p TDap  - Genetic screening: declines  - Wet prep: negative. Reviewed likely normal physiologic discharge of pregnancy, but cannot exclude loss of mucous plug. PTL precautions reviewed.  - Will need GBS at upcoming visit    #Delivery planning:  - Planned c-hyst on 4/25 at 34w5d. Upcoming care conference for delivery planning.  - Declines NICU consult          20 minutes spent on the date of the encounter, doing chart review, history and exam, documentation and further activities as noted.      Manisha Brandon CNM on 4/5/2022 at 1:58 PM

## 2022-04-05 NOTE — PATIENT INSTRUCTIONS
Complete an echocardiogram (ultrasound of heart).  As soon as results are compiled and reviewed, you will be notified.

## 2022-04-13 NOTE — CARE CONFERENCE
This is protected health information and must be strictly protected for patient privacy. Violations will be followed up per compliance and HIPPA policies.    2022 Plan of Care for Bhumi Lorenzo MRN: [id number] :  1990  Multidisciplinary Team care conference held 2022    Situation: Bhumi Lorenzo is a 32 year old  at 33w0d (JESSENIA 22) with placenta accrete spectrum, placenta previa and moderate mitral valve prolapse with regurgitation and recent complaints of palpitations.     Back Ground:      HPI:   OBSTETRIC HISTORY:  Pregnancy Complications:    Complete previa, PAS  - c/s x3  - Shortened cervix   - Hx moderate mitral valve regurgitation and prolapse  - Palpitations this pregnancy     Placenta Accreta Spectrum   Placenta Previa   - Complete placenta previa noted on comprehensive US performed 22 at 22w1d. Follow-up transvaginal ultrasound on 22 with previa and normal cervix length.  - US on  also showed multiple vascular lacunae within the placenta with loss of the hypoechoic zone between the placenta and myometrium. There was also increased subplacental vascularity. 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   - Comprehensive US performed again today, imaging reviewed. Ongoing findings concerning for PAS with multiple large sonolucent spaces with increased bladder edge vascularity.   - Given history of three prior  section, complete previa and PAS features recommendation at this time is to proceed with planned  hysterectomy at 34-35w. Patient states she is satisfied with child-bearing and agreeable with c-hyst. Again discussed with the patient the higher relative risk of including maternofetal compromise, need for transfusion, DIC, bladder and bowel injury.   - Plan for  hysterectomy at 34w GA with vertical abdominal incision  - Anaesthesia consult at 31-32 weeks.  - Betamethasone with & days of planned delivery     Moderate Mitral Valve Prolapse  with Regurgitation   - ECHO 11/2014 with normal LV function, EF 60%; mild anterior mitral valve prolapse with mild-to-moderate eccentric mitral regurgitation. Mild TR.   - Per Modified WHO Classification categories, mitral valve prolapse falls within the Class I conditions - these are associated with no detectible increased risk of maternal mortality and no/mild increase in morbidity (Brisa et al., 2018). Mild to moderate mitral regurgitation is well tolerated in pregnancy, while severe mitral regurgitation can be tolerated in pregnancy if there is no left ventricular systolic dysfunction or pulmonary hypertension.   - No specific labor and delivery management necessitated - given low risk cardiac lesion, use of analgesia/anesthesia and Valsalva during delivery not contraindicated.      #Mitral valve regurgitation:  #Palpitations:  - Cardiology visit today with recommendation for echo prior to her upcoming delivery. Recent episode of palpitations thought to be SVT and self-limiting.  - EKG on 2/16/22:   Vent. Rate : 098 BPM     Atrial Rate : 000 BPM      P-R Int : 150 ms          QRS Dur : 086 ms       QT Int : 338 ms       P-R-T Axes : 077 042 016 degrees      QTc Int : 394 ms   SINUS RHYTHM   NORMAL ECG   When compared with ECG of 11/10/14- no significant change other than rate   - Ziopatch ordered.      Imaging [facility] Comprehensive 3/31/2022    IMPRESSION  ---------------------------------------------------------------------------------------------------------  1) David intrauterine pregnancy at 31w 1d gestational age.  2) None of the anomalies commonly detected by ultrasound were evident in the limited fetal anatomic survey as described above.  3) Growth parameters and estimated fetal weight were consistent with established dates.  4) The amniotic fluid volume appeared normal.  5) Normal fetal activity for gestational age.  6) Again noted is a placenta previa with signs concerning for placenta  accreta. There is increased vascularity at the interface with the bladder. This looks similar to the last  exam.     Medical History:  Cardiology Consult:   2022  Patient here for evaluation of mitral valve prolapse and palpitations.  Patient is very active with no cardiac symptoms.  Used to have palpitations in the past which he is to last 5 to 10 minutes.  Did not bother her much.  Few days ago she got up from sleep at 4:00 with a fast heartbeat.  No associated symptoms specifically no chest pain shortness of breath dizziness or loss of consciousness.  Symptoms subsided by itself by rest.  Patient recorded this on her iWatch.  Rate was 170 bpm narrow complex tachycardia.  Probably SVT but details unclear.  Reviewed her records. Patient had a recent EKG which showed normal sinus rhythm and normal.  Care everywhere reviewed.  No echocardiogram available.  Records indicate mitral valve prolapse with moderate to severe mitral regurgitation.  Discussed difficulty in treating arrhythmia during pregnancy.  Especially when she is asymptomatic and with self-limiting.  No medication is needed at this time.  Discussed vagal maneuvers to control SVT.  Now Zio patch ordered as patient cannot recall her tachycardia and send it through [employer].  Discussed that many treatment options are available after her .  This will be in another 3 weeks time.  We will plan for an echocardiogram to assess MVP and severity of mitral regurgitation.  Patient will be contacted with the test result.  Clinically MR do not suggest that significant.  It should not interfere with the planned  and hysterectomy.  Return to Clinic in 1 year with an echocardiogram    Surgical History: c/s x3  Social History:  Live in [place], 100 miles away, would like assistance with lodging while baby is in NICU.  , spouse plans to be present for birth in the operating room.    Assessment and Plan:       Fetal Well Being:     RHONDA prior to delivery  in [place],  at [facility].      Obstetric Plan/Surgery Plan:       section with possible c-hyst scheduled in the main OR Room 12on  at 0730 at 34w5d with Skye Richey MD and Meredith Dixon, Gyn/Onc    RHONDA prior to delivery  in [place],  at [facility].      Pre-op shower and NPO instructions will be given at [facility] clinic     COVID-19 testing plan and preop labs     Pre-op CS orders and prepare blood products orders will be placed the evening before the case. If orders are not available, call the Senior Resident [phone].    Patient to arrive 2 hours prior to case  @ 0530    May need cardiac tele if concerns for palpitations are identified on admission    Instruments for the case available in Main OR per care conference. CS tray, radical hyst tray, Bookwalter Retractor and large Gildardo retractor.  Stents and cysto tray available, but not open.    Have the OR Sonosite US available with a long sterile sleeve US scan available to verify location of placenta. Vertical midline skin and classical hysterotomy or high transverse fundal incision are considerations.      High flow Arrington rapid transfuser    Lithotomy position.     Interventional radiology aware     Type and prepare for 4 units PRBCs, available in a cooler in the OR at the beginning surgery.      Due to blood shortage, aggressive hemorrhage management. Review of needs for Oxytocin, Methergine, Hemabate, Misoprostol or Tranexamic acid in the room in the OR at the start of the surgery.  These medications may be needed if the placenta does release and hysterectomy is not needed. If the placenta does not release, please hold on administering the uterotonic medications.   If 2nd dose of Tranexamic acid is needed, may administer 30 minutes following the first dose.    Specimen collection at delivery - Uterus, cervix, placenta and fallopian tubes - tbd     Overall reminders to everyone on the care team.  Minimize  conversations and extra chatter in the surgical suite.  Minimize extra people (limit to 20 if possible) in the room to minimize the risk of infection and door swings.     NICU to call OR Control Desk to determine when to set up the room.    OR team will call NICU for delivery when needed [phone].     Recommend that extra team members and NICU wait outside of the surgical suite until the anesthesia induction is complete.    Cord gas collection - c    OB RN will perform QBL     Delayed cord clamping - if COVID neg is recommended.    Anesthesia Plan: PACC Consult 3/31/2022  Assessment/Plan: Tentative Obstetrical Anesthesia Treatment plan developed in collaboration with the attending anesthesiologist Dr. Wisdom from PAC and Dr. Weir from OB.     Scheduled  planned for 22 with spinal epidural, followed by hysterectomy w/ general anesthesia.        Perioperative Plan:     Provide primary circulator    Responsible for specimen collection other than umbilical cord for gases or cord blood    Nursing Plan:     The Pre-Op RN will perform the usual pre-op procedures, review COVID-19 test results/screen on 3rd floor and notify L & D unit ([phone]) when the patient has arrived and is ready for final fetal assessment prior to transfer to the OR.     The OB RN will perform the usual maternal and fetal assessments preoperatively, be present during the surgery and is responsible for delivery documentation, performing QBL, notification of the birth date and time to Birthplace unit and  identification    OB RN Please discuss the possibility of use of donor breast milk preoperatively. Send signed Donor milk agreement form along with baby as needed. If not able to complete, the FOB may sign the form.    Support person plans to be present for the birth in the operating room.  OB RN responsible for assisting support person out of the room in case of an emergency. He plans to the NICU with the baby (if screens  COVID asymptomatic)    OB RN Review the checklist for  in Main OR. The Panda warmer in the L & D OR#3 will be brought to the 3rd floor OR.  The warmer is stocked with C/S supplies,  resuscitation supplies, and doptone.      OB RN Bring  bands, belly band for skin to skin, clear drape, sterile blankets, purple oral feeding syringes,  cord gas collection kit as well as the portable fetal monitor to the 3rd floor for fetal assessment.  Main OR has Bakri balloon and gram scale for QBL.     The OB RN is not the primary circulator.        NICU to call OR Control Desk to determine when to set up the room ~[phone].  OR team will call NICU for delivery when needed.     Due to blood shortage, aggressive hemorrhage management - need medication review based on COVID test results review in the huddle prior to the case. Review of needs for Oxytocin, Methergine, Hemabate, Tranexamic acid and Misoprostol in the room in the OR at the start of the case. These medications may be needed if the placenta does release and hysterectomy is not needed. If the placenta does not release, please hold on administering the uterotonic medications.   If 2nd dose of Tranexamic acid is needed, may administer 30 minutes following the first dose    OB RN will verify that an infusion pump, uterotonics and Tranexamic Acid are available in the OR during the case.      Due to the logistics of moving from a spinal to possible general anesthesia and the hysterectomy procedure, the NICU will stabilize the baby and may bring the baby to mom for STS a short timeframe.  OB RN to negotiate this with the NICU keeping in mind that the support person will need to be escorted out of the room when spinal is converted to general.     The OB RN is responsible for completing the Delivery Summary with the exception of the  Apgar score and resuscitation notes.      Plan for OB RN to assist patient with breast manual expression/massage as  patient is able in the PACU. Please discuss the possibility of use of donor breast milk preoperatively. Send signed Donor milk agreement form along with baby as needed. If not able to complete, the FOB may sign the form.    Routine postpartum and post-surgical care for post c/s with hysterectomy.  It is anticipated that the will recover in the Main PACU with a Main PACU RN and OB RN performing collaborative care (the patient may not have a uterus, OB RN to help with milk expression at this time and OB RN may not need to stay the entire recovery).      The postpartum stay is anticipated to be on the Birthplace Unit [facility]. Occasionally, there may be a concern regarding hemodynamic stability in the post-operative setting and for this, patient may need to be maintained in the ICU [address] [place] [address] or SICU located on [place].     Patient will need some support with hand expression and breast pumping in the ICU [address] [place] [address] or SICU will need to be set up with video Care Space if available. A plan will be made at that time if needed. NICU lactation to support.    Blood Bank Plan:     Periop lab staff carry Ascom [phone].    Type and cross for 4  PRBCs in a cooler at the beginning surgery. OB resident will place orders for crossmatched blood products available and checked in a cooler in the OR at the time of the incision.  If patient bleeds and case proceeds prior to scheduled time, an MTP may need to be called.    MTP is a possibility even with a scheduled and controlled case. Blood bank is alerted to possibility of MTP. Reminder that an Ascom phone will be included with the first cooler of blood and is to be used for primary communication back and forth with the blood bank.    The [facility] (cell saver provider) [phone] is alerted to her case per Periop Team.    If the case is emergent or she needs to deliver soon than the planned date, the team has a 30-minute timeframe to be available 24/7.  Perfusion Team has devised a technique to differentiate the various suction tubing that will be needed for the case - wall suction for amniotic fluid, and a reservoir system with differentiated to assist the surgeons will appropriate collection of blood. He will also obtain the leukocyte filters.      [place] Plan:    Declines NICU consult prenatally    Baby will be delivery at 34w5d    Will plan for BMZ prior to delivery  in [place],  at [facility].      NICU will attend the delivery in the 3rd floor Surgery Area and are responsible for any resuscitation needs.    OB or NICU nurse will support skin to skin - negotiate with surgical team to see if this is possible.  Space will be tight due to maternal retractors and there may be an emergent need to convert to general anesthesia.    Plan for delayed cord clamping at delivery if remains COVID neg.    NICU to call OR Control Desk to determine when to set up the room ~[phone].  OR team will call NICU for delivery when needed.     Recommend that extra team members and NICU wait outside of the surgical suite until the anesthesia induction is complete.    Depending on the baby s clinical condition at birth:   o NICU will stabilize and transport the baby to the NICU and NICU team to communicate with support person about accompanying the baby to the NICU.    The OB RN is responsible for completing the Delivery Summary with the exception of the  Apgar score and resuscitation notes.      Infant feeding plans are breastfeeding. Donor breastmilk agreement may need to be reviewed if not performed preoperatively.    Social Work:   Continue to support family in the postpartum recovery time.    Contact Phone #s and Pagers - place these numbers at the nurse s desk, sticky notes and on white board as needed    Maternal Fetal Medicine/OB Team  If any questions about medications or routine care please page the senior [facility] resident: [phone]   OB surgeon, Skye Richey MD  [facility]  Fellow   [facility] on service   Resident -[phone]  [facility] Patient Care Coordinator line: [phone]  Manisha Brandon CNM  Medical Director Madalyn Mejia MD cell [phone]    Gyn/Oncology Team - Meredith Vela   [phone]  Fellow Gyn Onc tbd     Interventional Radiology   pager [phone]    Periop Team:  [facility] Operating Room Team  3rd floor Control Desk [phone]  PACU [phone]  Lauren Broderick- Asst Head Nurse () for the Gyn/Urol Service Line [phone]  Amy Pappas- OR Perioperative Supervisor [phone] (Supervises the Gyn/Urol Service Line)  Ramona Kerr- OR Perioperative Supervisor [phone] (Back up if Amy is unavailable)  Maryjo Weiss- Perianesthesia and Peds Sedation Nurse Manager [phone]  Doris Plascencia- OR Nurse Manager [phone]  Leona Duong- Perianesthesia Perioperative Supervisor [phone]  Amy Samuels- Perianesthesia Perioperative Supervisor [phone]  Carmen Walters- Director of Surgical Services [phone]    Blood Bank/Transfusion Medicine:  [facility] [phone]; [facility] [phone]   Corinne Vasquez [phone]  Moni Armstrong Acute Lab Supervisor [phone]  Catia Guo-Shree Blood Bank Manager [phone]   [facility] Blood Bank      Cell Saver  [email or web address]. [phone]  [facility] (cell saver provider) [phone], Jasper Mayers, clinical manager    Labor and Delivery Unit   Charge Nurse Ascom [phone]  [phone] (to call from another campus [phone] and enter the Ascom #)  [facility]   Charge Nurse Ascom [phone] (to call from another campus [phone] and enter the Ascom #)  MAUREEN Aguirre, Birthplace [phone], cell [phone]  EJ Lagunas Labor and Delivery and Pregnancy Special Care Unit, [phone], cell [phone]  Mary Cheatham or Katty Quan, IBCLC, [phone] for lactation  support  Carissa George, [place] [facility], [phone], cell [phone]  Sena Hernandez,  Director [phone] cell    Anesthesia/Surgery  Main Pager - [phone]   Maria Teresa [email or web address] Cell [phone]  [email or web address] [email or web address] Cell [phone]  Wilberto [email or web address]     NICU Team  Brigitte Tai MD Neonatology pgr [phone]  Jennifer Oden, Lead NNP, [phone]   Maritza Garcia, [facility] [phone]  Desirae Scott, [place] NICU 11th floor [phone]  Elicia Jones [place], Med/Surg NICU  Kaci James, [place], NICU Small Baby Unit [phone]  Lactation if  in NICU - [phone]    Support Services  Cherise Javed, [facility] Social Work [facility] ([phone], pager [phone])  Jayla Holt, [facility] Social Work [facility] ([phone], pager [phone])  Manisha Joy, [facility] Social Work [facility] ([phone], pager [phone])  Brandi Rossi, [facility] Social Work [facility] ([phone], pager [phone])  Chaplain Yajaira (pager [phone])  Joselo Lopez, Child and Family Life  for help with Care Space if needed  Gabby Longbehn, Director Adult Care ANS Team   Catarchita Rivera, Manager of Patient Flow PPM team   Joselo Morocho, [facility] ICU

## 2022-04-14 ENCOUNTER — OFFICE VISIT (OUTPATIENT)
Dept: MATERNAL FETAL MEDICINE | Facility: CLINIC | Age: 32
End: 2022-04-14
Attending: OBSTETRICS & GYNECOLOGY
Payer: COMMERCIAL

## 2022-04-14 VITALS
OXYGEN SATURATION: 100 % | WEIGHT: 173.1 LBS | RESPIRATION RATE: 20 BRPM | BODY MASS INDEX: 27.69 KG/M2 | HEART RATE: 98 BPM | DIASTOLIC BLOOD PRESSURE: 67 MMHG | SYSTOLIC BLOOD PRESSURE: 105 MMHG

## 2022-04-14 DIAGNOSIS — O43.212 PLACENTA ACCRETA IN SECOND TRIMESTER: ICD-10-CM

## 2022-04-14 PROCEDURE — 87653 STREP B DNA AMP PROBE: CPT | Performed by: OBSTETRICS & GYNECOLOGY

## 2022-04-14 PROCEDURE — 99212 OFFICE O/P EST SF 10 MIN: CPT | Mod: GC | Performed by: OBSTETRICS & GYNECOLOGY

## 2022-04-14 PROCEDURE — G0463 HOSPITAL OUTPT CLINIC VISIT: HCPCS

## 2022-04-14 ASSESSMENT — PAIN SCALES - GENERAL: PAINLEVEL: NO PAIN (0)

## 2022-04-14 NOTE — NURSING NOTE
Bhumi here for follow-up obv due to preg c/b Placenta Accreta Spectrum, mod mitral valve regurgitation. Pt reports +FM, denies ctx, denies SROM, and denies vag bleeding.  Pt was given soap and instructions for C/S. Pt had GBS done today. Dr. Morgan and Dr. Mcghee in to talk with pt.  Pt reports she has her 1st Betamethasone injection scheduled for 4/21 and a maternal echo that day. Pt had all questions and concerns answered. Pt left amb and stable. Madalyn Trent RN

## 2022-04-14 NOTE — PROGRESS NOTES
Maternal-Fetal Medicine Prenatal Visit    Bhumi Lorenzo MRN# [id number]   Age: 32 year old  Estimated Date of Delivery: 2022            Gestational age: 33w1d YOB: 1990             Subjective:   Bhumi is feeling well today. She notes that she has not had any recurrence of her palpitations. She states that since her last episode, she has not had any shortness of breath or chest pain. The last episode that she had woke her from a deep sleep and lasted approximately 1 hour. Previously, when she has had similar episodes, she notes that they last only 10-15 minutes and resolve spontaneously. Otherwise, she has questions today regarding how necessary her procedure is and especially if she will need to proceed with hysterectomy. She is not necessarily considering future pregnancy at this time. She denies any leakage of fluid, contractions, or vaginal bleeding. She is noting consistent fetal movement.        Objective:        /67   Pulse 98   Resp 20   Wt 78.5 kg (173 lb 1.6 oz)   SpO2 100%   BMI 27.69 kg/m         No results found for this or any previous visit (from the past 24 hour(s)).    Gen: NAD, alert  Abd: gravid, soft, non tender  Doptones wnl  Ext: no edema            Assessment:        32 year old y.o.  at 33w1d by 7w6d US for return obstetrical visit.       1) Complete placenta previa with placenta accreta spectrum       2) History of  section x3       3) Shortened cervix, last 25.2 on 22       4) History of moderate mitral valve regurgitation       5) Palpitations in pregnancy           Plan:      #Mitral valve regurgitation:  #Palpitations:  - Cardiology visit  with recommendation for echo prior to her upcoming delivery. She will have this performed on  in [facility].   - Documentation from that visit discusses use of a zio patch though the patient has not received one and at this time, would take too long to complete prior to her surgery  - Recent  episode of palpitations thought to be SVT and self-limiting.  - EKG on 22:      Vent. 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  - Discussed that imaging findings have shown loss of placental interface with increased vascularity, and history of 3 previous  sections which is very concerning for a placenta accreta   - Risk of placenta accreta in the setting of previa at the time of her fourth  section is 61%  - At this time we would maintain our recommendation of a  hysterectomy at 34d5d on   - Patient will proceed to [facility] on  for her first dose of betamethasone and echocardiogram at that time  - Subsequently, will follow up at [facility] on  for second dose of betamethasone and pre-operative labs as well as covid testing  - Declines NICU consult  - GBS collected today     # FWB  - Doptones confirming FHR of 146    #Routine PNC:  - Prenatal labs:               Rh: +  antibody: neg               HepB/HIV/RPR: nonreactive               GC/CT: neg               Rubella: immune                 GCT: pass               UC: no growth  - Immunizations:  s/p TDap  - Genetic screening: declines  - GBS collected today     Patient seen and examined with Dr. Morgan    Resources:  [email or web address]    Eugenio Mcghee MD  OBGYN PGY-2  2022 11:59 AM       Physician Attestation   I,  Starla Morgan DO, saw and evaluated Bhumi Lorenzo with the resident.    I personally reviewed the vital signs, medications, labs and imaging.    My key history or physical exam findings/Key management.   US findings are consistent and concerning for placenta accreta spectrum.  We reviewed the risk for bleeding.  Plan for proceed with chyst on 4/25 as scheduled.  Discussed palpitations.  If symptoms begin again I would advise going to the ER at least to get an EKG to determine if ectopic beats vs SVT.  Patient will return next week for #2 BMZ.  She is scheduled for an echo in [place].       Starla Morgan DO  Date of Service (when I saw the patient): 4/14/22    Time Spent on this Encounter   I, Starla Morgan DO, spent a total of 15 minutes face to face or coordinating care of Bhumi Lorenzo.  Over 50% of my time on the unit was spent counseling the patient and/or coordinating care regarding placenta accreta spectrum.

## 2022-04-15 LAB — GP B STREP DNA SPEC QL NAA+PROBE: NEGATIVE

## 2022-04-18 ENCOUNTER — TRANSFERRED RECORDS (OUTPATIENT)
Dept: HEALTH INFORMATION MANAGEMENT | Facility: CLINIC | Age: 32
End: 2022-04-18
Payer: COMMERCIAL

## 2022-04-19 ENCOUNTER — TELEPHONE (OUTPATIENT)
Dept: MATERNAL FETAL MEDICINE | Facility: CLINIC | Age: 32
End: 2022-04-19
Payer: COMMERCIAL

## 2022-04-19 ENCOUNTER — TRANSFERRED RECORDS (OUTPATIENT)
Dept: HEALTH INFORMATION MANAGEMENT | Facility: CLINIC | Age: 32
End: 2022-04-19
Payer: COMMERCIAL

## 2022-04-19 NOTE — TELEPHONE ENCOUNTER
Writer called and left message for Bhumi to call back [facility] phone number to check in how she's doing as it was reported that she had cardioversion for SVT last evening in the ED. [facility] phone number left for pt to call back. Madalyn Trent RN

## 2022-04-20 ENCOUNTER — TELEPHONE (OUTPATIENT)
Dept: MATERNAL FETAL MEDICINE | Facility: CLINIC | Age: 32
End: 2022-04-20
Payer: COMMERCIAL

## 2022-04-20 DIAGNOSIS — Z11.59 ENCOUNTER FOR SCREENING FOR OTHER VIRAL DISEASES: Primary | ICD-10-CM

## 2022-04-20 NOTE — TELEPHONE ENCOUNTER
Bhumi called in this morning stating that she now feels good after being cardioverted on Monday am around 4:00 am in the ED for SVT. Pt denies SOB, and palpitations or any other cardiac symptoms.    Bhumi states she began cold symptoms on Monday, April 18th.. Pt states she has nasal congestions and runny nose, and dry cough.    Pt tests weekly for Covid and tested + for Covid on 4/18.    Pt emailed Covid test and will discuss when her C/Hyst may take place.    Madalyn Trent RN

## 2022-04-26 ENCOUNTER — ANESTHESIA (OUTPATIENT)
Dept: SURGERY | Facility: CLINIC | Age: 32
End: 2022-04-26
Payer: COMMERCIAL

## 2022-04-26 ENCOUNTER — ANESTHESIA EVENT (OUTPATIENT)
Dept: SURGERY | Facility: CLINIC | Age: 32
End: 2022-04-26
Payer: COMMERCIAL

## 2022-04-26 ENCOUNTER — HOSPITAL ENCOUNTER (INPATIENT)
Facility: CLINIC | Age: 32
LOS: 5 days | Discharge: HOME HEALTH | End: 2022-05-01
Attending: OBSTETRICS & GYNECOLOGY | Admitting: OBSTETRICS & GYNECOLOGY
Payer: COMMERCIAL

## 2022-04-26 DIAGNOSIS — O43.219 PLACENTA ACCRETA AFFECTING DELIVERY: ICD-10-CM

## 2022-04-26 DIAGNOSIS — O98.513 COVID-19 AFFECTING PREGNANCY IN THIRD TRIMESTER: ICD-10-CM

## 2022-04-26 DIAGNOSIS — O43.212 PLACENTA ACCRETA IN SECOND TRIMESTER: ICD-10-CM

## 2022-04-26 DIAGNOSIS — U07.1 COVID-19 AFFECTING PREGNANCY IN THIRD TRIMESTER: ICD-10-CM

## 2022-04-26 LAB
ABO/RH(D): NORMAL
ALBUMIN SERPL-MCNC: 2.3 G/DL (ref 3.4–5)
ALP SERPL-CCNC: 85 U/L (ref 40–150)
ALT SERPL W P-5'-P-CCNC: 18 U/L (ref 0–50)
ANION GAP SERPL CALCULATED.3IONS-SCNC: 6 MMOL/L (ref 3–14)
ANION GAP SERPL CALCULATED.3IONS-SCNC: 7 MMOL/L (ref 3–14)
ANTIBODY SCREEN: NEGATIVE
APTT PPP: 23 SECONDS (ref 22–38)
APTT PPP: 26 SECONDS (ref 22–38)
APTT PPP: 27 SECONDS (ref 22–38)
APTT PPP: 31 SECONDS (ref 22–38)
AST SERPL W P-5'-P-CCNC: 27 U/L (ref 0–45)
BASE EXCESS BLDV CALC-SCNC: -4.4 MMOL/L (ref -7.7–1.9)
BASE EXCESS BLDV CALC-SCNC: -6.9 MMOL/L (ref -7.7–1.9)
BILIRUB SERPL-MCNC: 2.4 MG/DL (ref 0.2–1.3)
BLD PROD TYP BPU: NORMAL
BLOOD COMPONENT TYPE: NORMAL
BUN SERPL-MCNC: 9 MG/DL (ref 7–30)
BUN SERPL-MCNC: 9 MG/DL (ref 7–30)
CA-I BLD-MCNC: 3.4 MG/DL (ref 4.4–5.2)
CA-I BLD-MCNC: 4.9 MG/DL (ref 4.4–5.2)
CALCIUM SERPL-MCNC: 7.9 MG/DL (ref 8.5–10.1)
CALCIUM SERPL-MCNC: 8.1 MG/DL (ref 8.5–10.1)
CHLORIDE BLD-SCNC: 107 MMOL/L (ref 94–109)
CHLORIDE BLD-SCNC: 110 MMOL/L (ref 94–109)
CO2 SERPL-SCNC: 21 MMOL/L (ref 20–32)
CO2 SERPL-SCNC: 24 MMOL/L (ref 20–32)
CODING SYSTEM: NORMAL
CREAT SERPL-MCNC: 0.72 MG/DL (ref 0.52–1.04)
CREAT SERPL-MCNC: 0.74 MG/DL (ref 0.52–1.04)
CROSSMATCH: NORMAL
ERYTHROCYTE [DISTWIDTH] IN BLOOD BY AUTOMATED COUNT: 12.9 % (ref 10–15)
ERYTHROCYTE [DISTWIDTH] IN BLOOD BY AUTOMATED COUNT: 13.2 % (ref 10–15)
ERYTHROCYTE [DISTWIDTH] IN BLOOD BY AUTOMATED COUNT: 13.5 % (ref 10–15)
ERYTHROCYTE [DISTWIDTH] IN BLOOD BY AUTOMATED COUNT: 13.6 % (ref 10–15)
ERYTHROCYTE [DISTWIDTH] IN BLOOD BY AUTOMATED COUNT: 13.9 % (ref 10–15)
FETAL RBC % LFV: 0 %
FETAL RBC (ML): 0 ML
FIBRINOGEN PPP-MCNC: 254 MG/DL (ref 170–490)
FIBRINOGEN PPP-MCNC: 276 MG/DL (ref 170–490)
FIBRINOGEN PPP-MCNC: 334 MG/DL (ref 170–490)
FIBRINOGEN PPP-MCNC: 563 MG/DL (ref 170–490)
GFR SERPL CREATININE-BSD FRML MDRD: >90 ML/MIN/1.73M2
GFR SERPL CREATININE-BSD FRML MDRD: >90 ML/MIN/1.73M2
GLUCOSE BLD-MCNC: 123 MG/DL (ref 70–99)
GLUCOSE BLD-MCNC: 173 MG/DL (ref 70–99)
GLUCOSE BLD-MCNC: 193 MG/DL (ref 70–99)
GLUCOSE BLD-MCNC: 207 MG/DL (ref 70–99)
HCO3 BLDV-SCNC: 21 MMOL/L (ref 21–28)
HCO3 BLDV-SCNC: 22 MMOL/L (ref 21–28)
HCT VFR BLD AUTO: 27.6 % (ref 35–47)
HCT VFR BLD AUTO: 29.8 % (ref 35–47)
HCT VFR BLD AUTO: 32.7 % (ref 35–47)
HCT VFR BLD AUTO: 34.5 % (ref 35–47)
HCT VFR BLD AUTO: 35.7 % (ref 35–47)
HGB BLD-MCNC: 10 G/DL (ref 11.7–15.7)
HGB BLD-MCNC: 10.4 G/DL (ref 11.7–15.7)
HGB BLD-MCNC: 10.9 G/DL (ref 11.7–15.7)
HGB BLD-MCNC: 11.2 G/DL (ref 11.7–15.7)
HGB BLD-MCNC: 11.9 G/DL (ref 11.7–15.7)
HGB BLD-MCNC: 12.4 G/DL (ref 11.7–15.7)
HGB BLD-MCNC: 9.8 G/DL (ref 11.7–15.7)
HOLD SPECIMEN: NORMAL
IF INDICATED RECOMMENDED DOSE OF RH IMMUNE GLOBULIN UG: 300 UG
INR PPP: 0.99 (ref 0.86–1.14)
INR PPP: 1.03 (ref 0.85–1.15)
INR PPP: 1.1 (ref 0.86–1.14)
INR PPP: 1.24 (ref 0.86–1.14)
ISSUE DATE AND TIME: NORMAL
MCH RBC QN AUTO: 31.1 PG (ref 26.5–33)
MCH RBC QN AUTO: 31.3 PG (ref 26.5–33)
MCH RBC QN AUTO: 31.4 PG (ref 26.5–33)
MCH RBC QN AUTO: 31.5 PG (ref 26.5–33)
MCH RBC QN AUTO: 31.7 PG (ref 26.5–33)
MCHC RBC AUTO-ENTMCNC: 33.3 G/DL (ref 31.5–36.5)
MCHC RBC AUTO-ENTMCNC: 33.6 G/DL (ref 31.5–36.5)
MCHC RBC AUTO-ENTMCNC: 34.5 G/DL (ref 31.5–36.5)
MCHC RBC AUTO-ENTMCNC: 34.7 G/DL (ref 31.5–36.5)
MCHC RBC AUTO-ENTMCNC: 35.5 G/DL (ref 31.5–36.5)
MCV RBC AUTO: 89 FL (ref 78–100)
MCV RBC AUTO: 90 FL (ref 78–100)
MCV RBC AUTO: 91 FL (ref 78–100)
MCV RBC AUTO: 93 FL (ref 78–100)
MCV RBC AUTO: 95 FL (ref 78–100)
O2/TOTAL GAS SETTING VFR VENT: 21 %
O2/TOTAL GAS SETTING VFR VENT: 21 %
PCO2 BLDV: 48 MM HG (ref 40–50)
PCO2 BLDV: 52 MM HG (ref 40–50)
PH BLDV: 7.22 [PH] (ref 7.32–7.43)
PH BLDV: 7.28 [PH] (ref 7.32–7.43)
PLATELET # BLD AUTO: 121 10E3/UL (ref 150–450)
PLATELET # BLD AUTO: 140 10E3/UL (ref 150–450)
PLATELET # BLD AUTO: 145 10E3/UL (ref 150–450)
PLATELET # BLD AUTO: 157 10E3/UL (ref 150–450)
PLATELET # BLD AUTO: 174 10E3/UL (ref 150–450)
PO2 BLDV: 54 MM HG (ref 25–47)
PO2 BLDV: 58 MM HG (ref 25–47)
POTASSIUM BLD-SCNC: 4.2 MMOL/L (ref 3.4–5.3)
POTASSIUM BLD-SCNC: 4.3 MMOL/L (ref 3.4–5.3)
POTASSIUM BLD-SCNC: 4.4 MMOL/L (ref 3.4–5.3)
POTASSIUM BLD-SCNC: 5.4 MMOL/L (ref 3.4–5.3)
PROT SERPL-MCNC: 5.6 G/DL (ref 6.8–8.8)
RBC # BLD AUTO: 3.12 10E6/UL (ref 3.8–5.2)
RBC # BLD AUTO: 3.2 10E6/UL (ref 3.8–5.2)
RBC # BLD AUTO: 3.46 10E6/UL (ref 3.8–5.2)
RBC # BLD AUTO: 3.83 10E6/UL (ref 3.8–5.2)
RBC # BLD AUTO: 3.91 10E6/UL (ref 3.8–5.2)
SODIUM SERPL-SCNC: 137 MMOL/L (ref 133–144)
SODIUM SERPL-SCNC: 138 MMOL/L (ref 133–144)
SODIUM SERPL-SCNC: 138 MMOL/L (ref 133–144)
SODIUM SERPL-SCNC: 139 MMOL/L (ref 133–144)
SPECIMEN EXPIRATION DATE: NORMAL
UNIT ABO/RH: NORMAL
UNIT NUMBER: NORMAL
UNIT STATUS: NORMAL
UNIT TYPE ISBT: 5100
UNIT TYPE ISBT: 6200
WBC # BLD AUTO: 11.9 10E3/UL (ref 4–11)
WBC # BLD AUTO: 16.8 10E3/UL (ref 4–11)
WBC # BLD AUTO: 16.9 10E3/UL (ref 4–11)
WBC # BLD AUTO: 22 10E3/UL (ref 4–11)
WBC # BLD AUTO: 9.3 10E3/UL (ref 4–11)

## 2022-04-26 PROCEDURE — 85027 COMPLETE CBC AUTOMATED: CPT | Performed by: STUDENT IN AN ORGANIZED HEALTH CARE EDUCATION/TRAINING PROGRAM

## 2022-04-26 PROCEDURE — P9037 PLATE PHERES LEUKOREDU IRRAD: HCPCS | Performed by: OBSTETRICS & GYNECOLOGY

## 2022-04-26 PROCEDURE — 85610 PROTHROMBIN TIME: CPT | Performed by: STUDENT IN AN ORGANIZED HEALTH CARE EDUCATION/TRAINING PROGRAM

## 2022-04-26 PROCEDURE — 85027 COMPLETE CBC AUTOMATED: CPT | Performed by: OBSTETRICS & GYNECOLOGY

## 2022-04-26 PROCEDURE — 360N000076 HC SURGERY LEVEL 3, PER MIN: Performed by: OBSTETRICS & GYNECOLOGY

## 2022-04-26 PROCEDURE — 250N000025 HC SEVOFLURANE, PER MIN: Performed by: OBSTETRICS & GYNECOLOGY

## 2022-04-26 PROCEDURE — 85384 FIBRINOGEN ACTIVITY: CPT | Performed by: ANESTHESIOLOGY

## 2022-04-26 PROCEDURE — 250N000013 HC RX MED GY IP 250 OP 250 PS 637: Performed by: STUDENT IN AN ORGANIZED HEALTH CARE EDUCATION/TRAINING PROGRAM

## 2022-04-26 PROCEDURE — C9290 INJ, BUPIVACAINE LIPOSOME: HCPCS | Performed by: ANESTHESIOLOGY

## 2022-04-26 PROCEDURE — 84132 ASSAY OF SERUM POTASSIUM: CPT | Performed by: STUDENT IN AN ORGANIZED HEALTH CARE EDUCATION/TRAINING PROGRAM

## 2022-04-26 PROCEDURE — 85384 FIBRINOGEN ACTIVITY: CPT | Performed by: STUDENT IN AN ORGANIZED HEALTH CARE EDUCATION/TRAINING PROGRAM

## 2022-04-26 PROCEDURE — 85460 HEMOGLOBIN FETAL: CPT | Performed by: STUDENT IN AN ORGANIZED HEALTH CARE EDUCATION/TRAINING PROGRAM

## 2022-04-26 PROCEDURE — 85730 THROMBOPLASTIN TIME PARTIAL: CPT | Performed by: OBSTETRICS & GYNECOLOGY

## 2022-04-26 PROCEDURE — 86923 COMPATIBILITY TEST ELECTRIC: CPT | Performed by: STUDENT IN AN ORGANIZED HEALTH CARE EDUCATION/TRAINING PROGRAM

## 2022-04-26 PROCEDURE — 999N000141 HC STATISTIC PRE-PROCEDURE NURSING ASSESSMENT: Performed by: OBSTETRICS & GYNECOLOGY

## 2022-04-26 PROCEDURE — 370N000017 HC ANESTHESIA TECHNICAL FEE, PER MIN: Performed by: OBSTETRICS & GYNECOLOGY

## 2022-04-26 PROCEDURE — 36415 COLL VENOUS BLD VENIPUNCTURE: CPT | Performed by: STUDENT IN AN ORGANIZED HEALTH CARE EDUCATION/TRAINING PROGRAM

## 2022-04-26 PROCEDURE — 120N000002 HC R&B MED SURG/OB UMMC

## 2022-04-26 PROCEDURE — 88307 TISSUE EXAM BY PATHOLOGIST: CPT | Mod: TC | Performed by: OBSTETRICS & GYNECOLOGY

## 2022-04-26 PROCEDURE — P9041 ALBUMIN (HUMAN),5%, 50ML: HCPCS | Performed by: NURSE ANESTHETIST, CERTIFIED REGISTERED

## 2022-04-26 PROCEDURE — 250N000009 HC RX 250: Performed by: NURSE ANESTHETIST, CERTIFIED REGISTERED

## 2022-04-26 PROCEDURE — 88307 TISSUE EXAM BY PATHOLOGIST: CPT | Mod: 26 | Performed by: PATHOLOGY

## 2022-04-26 PROCEDURE — 272N000001 HC OR GENERAL SUPPLY STERILE: Performed by: OBSTETRICS & GYNECOLOGY

## 2022-04-26 PROCEDURE — 250N000011 HC RX IP 250 OP 636: Performed by: STUDENT IN AN ORGANIZED HEALTH CARE EDUCATION/TRAINING PROGRAM

## 2022-04-26 PROCEDURE — 250N000011 HC RX IP 250 OP 636: Performed by: NURSE ANESTHETIST, CERTIFIED REGISTERED

## 2022-04-26 PROCEDURE — 0UT70ZZ RESECTION OF BILATERAL FALLOPIAN TUBES, OPEN APPROACH: ICD-10-PCS | Performed by: OBSTETRICS & GYNECOLOGY

## 2022-04-26 PROCEDURE — 82040 ASSAY OF SERUM ALBUMIN: CPT | Performed by: STUDENT IN AN ORGANIZED HEALTH CARE EDUCATION/TRAINING PROGRAM

## 2022-04-26 PROCEDURE — 0UT90ZZ RESECTION OF UTERUS, OPEN APPROACH: ICD-10-PCS | Performed by: OBSTETRICS & GYNECOLOGY

## 2022-04-26 PROCEDURE — 59514 CESAREAN DELIVERY ONLY: CPT | Mod: GC | Performed by: OBSTETRICS & GYNECOLOGY

## 2022-04-26 PROCEDURE — 250N000011 HC RX IP 250 OP 636: Performed by: ANESTHESIOLOGY

## 2022-04-26 PROCEDURE — 85730 THROMBOPLASTIN TIME PARTIAL: CPT | Performed by: ANESTHESIOLOGY

## 2022-04-26 PROCEDURE — C1769 GUIDE WIRE: HCPCS | Performed by: OBSTETRICS & GYNECOLOGY

## 2022-04-26 PROCEDURE — 250N000013 HC RX MED GY IP 250 OP 250 PS 637: Performed by: OBSTETRICS & GYNECOLOGY

## 2022-04-26 PROCEDURE — 85610 PROTHROMBIN TIME: CPT | Performed by: ANESTHESIOLOGY

## 2022-04-26 PROCEDURE — P9016 RBC LEUKOCYTES REDUCED: HCPCS

## 2022-04-26 PROCEDURE — 85384 FIBRINOGEN ACTIVITY: CPT | Performed by: OBSTETRICS & GYNECOLOGY

## 2022-04-26 PROCEDURE — P9016 RBC LEUKOCYTES REDUCED: HCPCS | Performed by: STUDENT IN AN ORGANIZED HEALTH CARE EDUCATION/TRAINING PROGRAM

## 2022-04-26 PROCEDURE — P9016 RBC LEUKOCYTES REDUCED: HCPCS | Performed by: OBSTETRICS & GYNECOLOGY

## 2022-04-26 PROCEDURE — 85730 THROMBOPLASTIN TIME PARTIAL: CPT | Performed by: STUDENT IN AN ORGANIZED HEALTH CARE EDUCATION/TRAINING PROGRAM

## 2022-04-26 PROCEDURE — 250N000009 HC RX 250: Performed by: OBSTETRICS & GYNECOLOGY

## 2022-04-26 PROCEDURE — P9059 PLASMA, FRZ BETWEEN 8-24HOUR: HCPCS | Performed by: OBSTETRICS & GYNECOLOGY

## 2022-04-26 PROCEDURE — 85610 PROTHROMBIN TIME: CPT | Performed by: OBSTETRICS & GYNECOLOGY

## 2022-04-26 PROCEDURE — 0TQB8ZZ REPAIR BLADDER, VIA NATURAL OR ARTIFICIAL OPENING ENDOSCOPIC: ICD-10-PCS | Performed by: OBSTETRICS & GYNECOLOGY

## 2022-04-26 PROCEDURE — 82330 ASSAY OF CALCIUM: CPT | Performed by: ANESTHESIOLOGY

## 2022-04-26 PROCEDURE — 258N000003 HC RX IP 258 OP 636: Performed by: NURSE ANESTHETIST, CERTIFIED REGISTERED

## 2022-04-26 PROCEDURE — 82330 ASSAY OF CALCIUM: CPT | Performed by: OBSTETRICS & GYNECOLOGY

## 2022-04-26 PROCEDURE — 86923 COMPATIBILITY TEST ELECTRIC: CPT | Performed by: OBSTETRICS & GYNECOLOGY

## 2022-04-26 PROCEDURE — 710N000011 HC RECOVERY PHASE 1, LEVEL 3, PER MIN: Performed by: OBSTETRICS & GYNECOLOGY

## 2022-04-26 PROCEDURE — 85014 HEMATOCRIT: CPT | Performed by: ANESTHESIOLOGY

## 2022-04-26 PROCEDURE — 86901 BLOOD TYPING SEROLOGIC RH(D): CPT | Performed by: STUDENT IN AN ORGANIZED HEALTH CARE EDUCATION/TRAINING PROGRAM

## 2022-04-26 PROCEDURE — 36415 COLL VENOUS BLD VENIPUNCTURE: CPT | Performed by: OBSTETRICS & GYNECOLOGY

## 2022-04-26 RX ORDER — CARBOPROST TROMETHAMINE 250 UG/ML
250 INJECTION, SOLUTION INTRAMUSCULAR
Status: DISCONTINUED | OUTPATIENT
Start: 2022-04-26 | End: 2022-05-01 | Stop reason: HOSPADM

## 2022-04-26 RX ORDER — ALBUMIN, HUMAN INJ 5% 5 %
SOLUTION INTRAVENOUS CONTINUOUS PRN
Status: DISCONTINUED | OUTPATIENT
Start: 2022-04-26 | End: 2022-04-26

## 2022-04-26 RX ORDER — CALCIUM CHLORIDE 100 MG/ML
INJECTION INTRAVENOUS; INTRAVENTRICULAR PRN
Status: DISCONTINUED | OUTPATIENT
Start: 2022-04-26 | End: 2022-04-26

## 2022-04-26 RX ORDER — LIDOCAINE HYDROCHLORIDE 20 MG/ML
INJECTION, SOLUTION INFILTRATION; PERINEURAL PRN
Status: DISCONTINUED | OUTPATIENT
Start: 2022-04-26 | End: 2022-04-26

## 2022-04-26 RX ORDER — POLYETHYLENE GLYCOL 3350 17 G/17G
17 POWDER, FOR SOLUTION ORAL DAILY
Status: DISCONTINUED | OUTPATIENT
Start: 2022-04-26 | End: 2022-05-01 | Stop reason: HOSPADM

## 2022-04-26 RX ORDER — CEFAZOLIN SODIUM 2 G/100ML
2 INJECTION, SOLUTION INTRAVENOUS
Status: DISCONTINUED | OUTPATIENT
Start: 2022-04-26 | End: 2022-04-26 | Stop reason: HOSPADM

## 2022-04-26 RX ORDER — OXYTOCIN 10 [USP'U]/ML
10 INJECTION, SOLUTION INTRAMUSCULAR; INTRAVENOUS
Status: DISCONTINUED | OUTPATIENT
Start: 2022-04-26 | End: 2022-04-27

## 2022-04-26 RX ORDER — OXYTOCIN 10 [USP'U]/ML
10 INJECTION, SOLUTION INTRAMUSCULAR; INTRAVENOUS
Status: DISCONTINUED | OUTPATIENT
Start: 2022-04-26 | End: 2022-04-26 | Stop reason: HOSPADM

## 2022-04-26 RX ORDER — NALOXONE HYDROCHLORIDE 0.4 MG/ML
0.4 INJECTION, SOLUTION INTRAMUSCULAR; INTRAVENOUS; SUBCUTANEOUS
Status: DISCONTINUED | OUTPATIENT
Start: 2022-04-26 | End: 2022-05-01 | Stop reason: HOSPADM

## 2022-04-26 RX ORDER — HYDROCORTISONE 2.5 %
CREAM (GRAM) TOPICAL 3 TIMES DAILY PRN
Status: DISCONTINUED | OUTPATIENT
Start: 2022-04-26 | End: 2022-05-01 | Stop reason: HOSPADM

## 2022-04-26 RX ORDER — AMOXICILLIN 250 MG
1 CAPSULE ORAL 2 TIMES DAILY
Status: DISCONTINUED | OUTPATIENT
Start: 2022-04-26 | End: 2022-05-01 | Stop reason: HOSPADM

## 2022-04-26 RX ORDER — LIDOCAINE 40 MG/G
CREAM TOPICAL
Status: DISCONTINUED | OUTPATIENT
Start: 2022-04-26 | End: 2022-04-26 | Stop reason: HOSPADM

## 2022-04-26 RX ORDER — NALOXONE HYDROCHLORIDE 0.4 MG/ML
0.2 INJECTION, SOLUTION INTRAMUSCULAR; INTRAVENOUS; SUBCUTANEOUS
Status: DISCONTINUED | OUTPATIENT
Start: 2022-04-26 | End: 2022-05-01 | Stop reason: HOSPADM

## 2022-04-26 RX ORDER — ONDANSETRON 4 MG/1
4 TABLET, ORALLY DISINTEGRATING ORAL EVERY 6 HOURS PRN
Status: DISCONTINUED | OUTPATIENT
Start: 2022-04-26 | End: 2022-05-01 | Stop reason: HOSPADM

## 2022-04-26 RX ORDER — HYDROMORPHONE HYDROCHLORIDE 1 MG/ML
0.2 INJECTION, SOLUTION INTRAMUSCULAR; INTRAVENOUS; SUBCUTANEOUS EVERY 5 MIN PRN
Status: DISCONTINUED | OUTPATIENT
Start: 2022-04-26 | End: 2022-04-27

## 2022-04-26 RX ORDER — BUPIVACAINE HYDROCHLORIDE 2.5 MG/ML
INJECTION, SOLUTION EPIDURAL; INFILTRATION; INTRACAUDAL
Status: DISCONTINUED | OUTPATIENT
Start: 2022-04-26 | End: 2022-04-26

## 2022-04-26 RX ORDER — SODIUM CHLORIDE, SODIUM LACTATE, POTASSIUM CHLORIDE, CALCIUM CHLORIDE 600; 310; 30; 20 MG/100ML; MG/100ML; MG/100ML; MG/100ML
INJECTION, SOLUTION INTRAVENOUS CONTINUOUS
Status: DISCONTINUED | OUTPATIENT
Start: 2022-04-26 | End: 2022-04-26 | Stop reason: HOSPADM

## 2022-04-26 RX ORDER — DEXTROSE, SODIUM CHLORIDE, SODIUM LACTATE, POTASSIUM CHLORIDE, AND CALCIUM CHLORIDE 5; .6; .31; .03; .02 G/100ML; G/100ML; G/100ML; G/100ML; G/100ML
INJECTION, SOLUTION INTRAVENOUS
Status: DISCONTINUED
Start: 2022-04-26 | End: 2022-04-26 | Stop reason: HOSPADM

## 2022-04-26 RX ORDER — SIMETHICONE 80 MG
80 TABLET,CHEWABLE ORAL 4 TIMES DAILY PRN
Status: DISCONTINUED | OUTPATIENT
Start: 2022-04-26 | End: 2022-05-01 | Stop reason: HOSPADM

## 2022-04-26 RX ORDER — DEXTROSE, SODIUM CHLORIDE, SODIUM LACTATE, POTASSIUM CHLORIDE, AND CALCIUM CHLORIDE 5; .6; .31; .03; .02 G/100ML; G/100ML; G/100ML; G/100ML; G/100ML
INJECTION, SOLUTION INTRAVENOUS CONTINUOUS
Status: DISCONTINUED | OUTPATIENT
Start: 2022-04-26 | End: 2022-05-01 | Stop reason: HOSPADM

## 2022-04-26 RX ORDER — CARBOPROST TROMETHAMINE 250 UG/ML
250 INJECTION, SOLUTION INTRAMUSCULAR
Status: DISCONTINUED | OUTPATIENT
Start: 2022-04-26 | End: 2022-04-26 | Stop reason: HOSPADM

## 2022-04-26 RX ORDER — OXYTOCIN/0.9 % SODIUM CHLORIDE 30/500 ML
PLASTIC BAG, INJECTION (ML) INTRAVENOUS CONTINUOUS PRN
Status: DISCONTINUED | OUTPATIENT
Start: 2022-04-26 | End: 2022-04-26

## 2022-04-26 RX ORDER — FENTANYL CITRATE 50 UG/ML
INJECTION, SOLUTION INTRAMUSCULAR; INTRAVENOUS PRN
Status: DISCONTINUED | OUTPATIENT
Start: 2022-04-26 | End: 2022-04-26

## 2022-04-26 RX ORDER — OXYCODONE HYDROCHLORIDE 5 MG/1
5 TABLET ORAL EVERY 4 HOURS PRN
Status: DISCONTINUED | OUTPATIENT
Start: 2022-04-26 | End: 2022-04-26

## 2022-04-26 RX ORDER — LIDOCAINE 4 G/G
2 PATCH TOPICAL
Status: DISCONTINUED | OUTPATIENT
Start: 2022-04-26 | End: 2022-05-01 | Stop reason: HOSPADM

## 2022-04-26 RX ORDER — CEFAZOLIN SODIUM 1 G/3ML
INJECTION, POWDER, FOR SOLUTION INTRAMUSCULAR; INTRAVENOUS PRN
Status: DISCONTINUED | OUTPATIENT
Start: 2022-04-26 | End: 2022-04-26

## 2022-04-26 RX ORDER — OXYTOCIN/0.9 % SODIUM CHLORIDE 30/500 ML
340 PLASTIC BAG, INJECTION (ML) INTRAVENOUS CONTINUOUS PRN
Status: DISCONTINUED | OUTPATIENT
Start: 2022-04-26 | End: 2022-05-01 | Stop reason: HOSPADM

## 2022-04-26 RX ORDER — ACETAMINOPHEN 325 MG/1
650 TABLET ORAL EVERY 4 HOURS PRN
Status: CANCELLED | OUTPATIENT
Start: 2022-04-29

## 2022-04-26 RX ORDER — KETOROLAC TROMETHAMINE 30 MG/ML
30 INJECTION, SOLUTION INTRAMUSCULAR; INTRAVENOUS EVERY 6 HOURS
Status: COMPLETED | OUTPATIENT
Start: 2022-04-26 | End: 2022-04-27

## 2022-04-26 RX ORDER — ONDANSETRON 2 MG/ML
4 INJECTION INTRAMUSCULAR; INTRAVENOUS EVERY 30 MIN PRN
Status: DISCONTINUED | OUTPATIENT
Start: 2022-04-26 | End: 2022-04-26

## 2022-04-26 RX ORDER — EPHEDRINE SULFATE 50 MG/ML
INJECTION, SOLUTION INTRAMUSCULAR; INTRAVENOUS; SUBCUTANEOUS PRN
Status: DISCONTINUED | OUTPATIENT
Start: 2022-04-26 | End: 2022-04-26

## 2022-04-26 RX ORDER — METOCLOPRAMIDE 10 MG/1
10 TABLET ORAL EVERY 6 HOURS PRN
Status: DISCONTINUED | OUTPATIENT
Start: 2022-04-26 | End: 2022-05-01 | Stop reason: HOSPADM

## 2022-04-26 RX ORDER — TRANEXAMIC ACID 10 MG/ML
1 INJECTION, SOLUTION INTRAVENOUS EVERY 30 MIN PRN
Status: DISCONTINUED | OUTPATIENT
Start: 2022-04-26 | End: 2022-05-01 | Stop reason: HOSPADM

## 2022-04-26 RX ORDER — PROPOFOL 10 MG/ML
INJECTION, EMULSION INTRAVENOUS PRN
Status: DISCONTINUED | OUTPATIENT
Start: 2022-04-26 | End: 2022-04-26

## 2022-04-26 RX ORDER — LIDOCAINE 40 MG/G
CREAM TOPICAL
Status: DISCONTINUED | OUTPATIENT
Start: 2022-04-26 | End: 2022-05-01 | Stop reason: HOSPADM

## 2022-04-26 RX ORDER — METHYLERGONOVINE MALEATE 0.2 MG/ML
INJECTION INTRAVENOUS PRN
Status: DISCONTINUED | OUTPATIENT
Start: 2022-04-26 | End: 2022-04-26

## 2022-04-26 RX ORDER — MISOPROSTOL 200 UG/1
400 TABLET ORAL
Status: DISCONTINUED | OUTPATIENT
Start: 2022-04-26 | End: 2022-05-01 | Stop reason: HOSPADM

## 2022-04-26 RX ORDER — ONDANSETRON 2 MG/ML
INJECTION INTRAMUSCULAR; INTRAVENOUS PRN
Status: DISCONTINUED | OUTPATIENT
Start: 2022-04-26 | End: 2022-04-26

## 2022-04-26 RX ORDER — AMOXICILLIN 250 MG
2 CAPSULE ORAL 2 TIMES DAILY
Status: DISCONTINUED | OUTPATIENT
Start: 2022-04-26 | End: 2022-05-01 | Stop reason: HOSPADM

## 2022-04-26 RX ORDER — ONDANSETRON 2 MG/ML
4 INJECTION INTRAMUSCULAR; INTRAVENOUS EVERY 6 HOURS PRN
Status: DISCONTINUED | OUTPATIENT
Start: 2022-04-26 | End: 2022-05-01 | Stop reason: HOSPADM

## 2022-04-26 RX ORDER — ACETAMINOPHEN 325 MG/1
975 TABLET ORAL ONCE
Status: DISCONTINUED | OUTPATIENT
Start: 2022-04-26 | End: 2022-04-26 | Stop reason: HOSPADM

## 2022-04-26 RX ORDER — METRONIDAZOLE 500 MG/100ML
INJECTION, SOLUTION INTRAVENOUS PRN
Status: DISCONTINUED | OUTPATIENT
Start: 2022-04-26 | End: 2022-04-26

## 2022-04-26 RX ORDER — PROCHLORPERAZINE 25 MG
25 SUPPOSITORY, RECTAL RECTAL EVERY 12 HOURS PRN
Status: DISCONTINUED | OUTPATIENT
Start: 2022-04-26 | End: 2022-05-01 | Stop reason: HOSPADM

## 2022-04-26 RX ORDER — MISOPROSTOL 200 UG/1
800 TABLET ORAL
Status: DISCONTINUED | OUTPATIENT
Start: 2022-04-26 | End: 2022-04-26 | Stop reason: HOSPADM

## 2022-04-26 RX ORDER — IBUPROFEN 800 MG/1
800 TABLET, FILM COATED ORAL EVERY 6 HOURS PRN
Status: DISCONTINUED | OUTPATIENT
Start: 2022-04-26 | End: 2022-04-26

## 2022-04-26 RX ORDER — DEXAMETHASONE SODIUM PHOSPHATE 4 MG/ML
INJECTION, SOLUTION INTRA-ARTICULAR; INTRALESIONAL; INTRAMUSCULAR; INTRAVENOUS; SOFT TISSUE PRN
Status: DISCONTINUED | OUTPATIENT
Start: 2022-04-26 | End: 2022-04-26

## 2022-04-26 RX ORDER — ENOXAPARIN SODIUM 100 MG/ML
40 INJECTION SUBCUTANEOUS EVERY 24 HOURS
Status: DISCONTINUED | OUTPATIENT
Start: 2022-04-27 | End: 2022-05-01 | Stop reason: HOSPADM

## 2022-04-26 RX ORDER — CITRIC ACID/SODIUM CITRATE 334-500MG
30 SOLUTION, ORAL ORAL
Status: DISCONTINUED | OUTPATIENT
Start: 2022-04-26 | End: 2022-04-26 | Stop reason: HOSPADM

## 2022-04-26 RX ORDER — OXYTOCIN 10 [USP'U]/ML
10 INJECTION, SOLUTION INTRAMUSCULAR; INTRAVENOUS
Status: DISCONTINUED | OUTPATIENT
Start: 2022-04-26 | End: 2022-05-01 | Stop reason: HOSPADM

## 2022-04-26 RX ORDER — METHYLERGONOVINE MALEATE 0.2 MG/ML
200 INJECTION INTRAVENOUS
Status: DISCONTINUED | OUTPATIENT
Start: 2022-04-26 | End: 2022-04-26 | Stop reason: HOSPADM

## 2022-04-26 RX ORDER — OXYTOCIN/0.9 % SODIUM CHLORIDE 30/500 ML
100-340 PLASTIC BAG, INJECTION (ML) INTRAVENOUS CONTINUOUS PRN
Status: DISCONTINUED | OUTPATIENT
Start: 2022-04-26 | End: 2022-04-27

## 2022-04-26 RX ORDER — CYCLOBENZAPRINE HCL 5 MG
10 TABLET ORAL EVERY 8 HOURS PRN
Status: DISCONTINUED | OUTPATIENT
Start: 2022-04-26 | End: 2022-05-01 | Stop reason: HOSPADM

## 2022-04-26 RX ORDER — MODIFIED LANOLIN
OINTMENT (GRAM) TOPICAL
Status: DISCONTINUED | OUTPATIENT
Start: 2022-04-26 | End: 2022-05-01 | Stop reason: HOSPADM

## 2022-04-26 RX ORDER — ACETAMINOPHEN 325 MG/1
975 TABLET ORAL EVERY 6 HOURS
Status: COMPLETED | OUTPATIENT
Start: 2022-04-26 | End: 2022-04-29

## 2022-04-26 RX ORDER — BISACODYL 10 MG
10 SUPPOSITORY, RECTAL RECTAL DAILY PRN
Status: DISCONTINUED | OUTPATIENT
Start: 2022-04-28 | End: 2022-05-01 | Stop reason: HOSPADM

## 2022-04-26 RX ORDER — ONDANSETRON 4 MG/1
4 TABLET, ORALLY DISINTEGRATING ORAL EVERY 30 MIN PRN
Status: DISCONTINUED | OUTPATIENT
Start: 2022-04-26 | End: 2022-04-26

## 2022-04-26 RX ORDER — CEFAZOLIN SODIUM 2 G/100ML
2 INJECTION, SOLUTION INTRAVENOUS SEE ADMIN INSTRUCTIONS
Status: DISCONTINUED | OUTPATIENT
Start: 2022-04-26 | End: 2022-04-26 | Stop reason: HOSPADM

## 2022-04-26 RX ORDER — SODIUM CHLORIDE 9 MG/ML
INJECTION, SOLUTION INTRAVENOUS CONTINUOUS PRN
Status: DISCONTINUED | OUTPATIENT
Start: 2022-04-26 | End: 2022-04-26

## 2022-04-26 RX ORDER — METHYLERGONOVINE MALEATE 0.2 MG/ML
200 INJECTION INTRAVENOUS
Status: DISCONTINUED | OUTPATIENT
Start: 2022-04-26 | End: 2022-05-01 | Stop reason: HOSPADM

## 2022-04-26 RX ORDER — SENNOSIDES 8.6 MG
8.6 TABLET ORAL 2 TIMES DAILY
Status: DISCONTINUED | OUTPATIENT
Start: 2022-04-26 | End: 2022-05-01 | Stop reason: HOSPADM

## 2022-04-26 RX ORDER — OXYTOCIN/0.9 % SODIUM CHLORIDE 30/500 ML
340 PLASTIC BAG, INJECTION (ML) INTRAVENOUS CONTINUOUS PRN
Status: DISCONTINUED | OUTPATIENT
Start: 2022-04-26 | End: 2022-04-26 | Stop reason: HOSPADM

## 2022-04-26 RX ORDER — OXYCODONE HYDROCHLORIDE 5 MG/1
5-10 TABLET ORAL EVERY 4 HOURS PRN
Status: DISCONTINUED | OUTPATIENT
Start: 2022-04-26 | End: 2022-05-01 | Stop reason: HOSPADM

## 2022-04-26 RX ORDER — METOCLOPRAMIDE HYDROCHLORIDE 5 MG/ML
10 INJECTION INTRAMUSCULAR; INTRAVENOUS EVERY 6 HOURS PRN
Status: DISCONTINUED | OUTPATIENT
Start: 2022-04-26 | End: 2022-05-01 | Stop reason: HOSPADM

## 2022-04-26 RX ORDER — FENTANYL CITRATE 50 UG/ML
25 INJECTION, SOLUTION INTRAMUSCULAR; INTRAVENOUS EVERY 5 MIN PRN
Status: DISCONTINUED | OUTPATIENT
Start: 2022-04-26 | End: 2022-04-26

## 2022-04-26 RX ORDER — MISOPROSTOL 200 UG/1
800 TABLET ORAL
Status: DISCONTINUED | OUTPATIENT
Start: 2022-04-26 | End: 2022-05-01 | Stop reason: HOSPADM

## 2022-04-26 RX ORDER — TRANEXAMIC ACID 10 MG/ML
1 INJECTION, SOLUTION INTRAVENOUS EVERY 30 MIN PRN
Status: DISCONTINUED | OUTPATIENT
Start: 2022-04-26 | End: 2022-04-26 | Stop reason: HOSPADM

## 2022-04-26 RX ORDER — CEFAZOLIN SODIUM 2 G/100ML
INJECTION, SOLUTION INTRAVENOUS PRN
Status: DISCONTINUED | OUTPATIENT
Start: 2022-04-26 | End: 2022-04-26

## 2022-04-26 RX ORDER — PROCHLORPERAZINE MALEATE 10 MG
10 TABLET ORAL EVERY 6 HOURS PRN
Status: DISCONTINUED | OUTPATIENT
Start: 2022-04-26 | End: 2022-05-01 | Stop reason: HOSPADM

## 2022-04-26 RX ORDER — SODIUM CHLORIDE, SODIUM LACTATE, POTASSIUM CHLORIDE, CALCIUM CHLORIDE 600; 310; 30; 20 MG/100ML; MG/100ML; MG/100ML; MG/100ML
INJECTION, SOLUTION INTRAVENOUS CONTINUOUS PRN
Status: DISCONTINUED | OUTPATIENT
Start: 2022-04-26 | End: 2022-04-26

## 2022-04-26 RX ORDER — MISOPROSTOL 200 UG/1
400 TABLET ORAL
Status: DISCONTINUED | OUTPATIENT
Start: 2022-04-26 | End: 2022-04-26 | Stop reason: HOSPADM

## 2022-04-26 RX ORDER — PROPOFOL 10 MG/ML
INJECTION, EMULSION INTRAVENOUS CONTINUOUS PRN
Status: DISCONTINUED | OUTPATIENT
Start: 2022-04-26 | End: 2022-04-26

## 2022-04-26 RX ORDER — SODIUM CHLORIDE, SODIUM LACTATE, POTASSIUM CHLORIDE, CALCIUM CHLORIDE 600; 310; 30; 20 MG/100ML; MG/100ML; MG/100ML; MG/100ML
INJECTION, SOLUTION INTRAVENOUS CONTINUOUS
Status: DISCONTINUED | OUTPATIENT
Start: 2022-04-26 | End: 2022-04-26

## 2022-04-26 RX ADMIN — PROPOFOL 200 MG: 10 INJECTION, EMULSION INTRAVENOUS at 02:59

## 2022-04-26 RX ADMIN — ACETAMINOPHEN 975 MG: 325 TABLET, FILM COATED ORAL at 02:20

## 2022-04-26 RX ADMIN — KETOROLAC TROMETHAMINE 30 MG: 30 INJECTION, SOLUTION INTRAMUSCULAR; INTRAVENOUS at 22:20

## 2022-04-26 RX ADMIN — ACETAMINOPHEN 975 MG: 325 TABLET ORAL at 10:33

## 2022-04-26 RX ADMIN — BUPIVACAINE HYDROCHLORIDE 20 ML: 2.5 INJECTION, SOLUTION EPIDURAL; INFILTRATION; INTRACAUDAL at 07:00

## 2022-04-26 RX ADMIN — Medication 100 MG: at 02:59

## 2022-04-26 RX ADMIN — FENTANYL CITRATE 50 MCG: 50 INJECTION, SOLUTION INTRAMUSCULAR; INTRAVENOUS at 03:40

## 2022-04-26 RX ADMIN — ROCURONIUM BROMIDE 20 MG: 50 INJECTION, SOLUTION INTRAVENOUS at 04:20

## 2022-04-26 RX ADMIN — PROPOFOL 25 MCG/KG/MIN: 10 INJECTION, EMULSION INTRAVENOUS at 03:37

## 2022-04-26 RX ADMIN — SODIUM CHLORIDE: 9 INJECTION, SOLUTION INTRAVENOUS at 05:50

## 2022-04-26 RX ADMIN — FENTANYL CITRATE 50 MCG: 50 INJECTION, SOLUTION INTRAMUSCULAR; INTRAVENOUS at 04:55

## 2022-04-26 RX ADMIN — SODIUM CHLORIDE, SODIUM LACTATE, POTASSIUM CHLORIDE, CALCIUM CHLORIDE AND DEXTROSE MONOHYDRATE: 5; 600; 310; 30; 20 INJECTION, SOLUTION INTRAVENOUS at 10:02

## 2022-04-26 RX ADMIN — PHENYLEPHRINE HYDROCHLORIDE 200 MCG: 10 INJECTION INTRAVENOUS at 03:52

## 2022-04-26 RX ADMIN — FENTANYL CITRATE 50 MCG: 50 INJECTION, SOLUTION INTRAMUSCULAR; INTRAVENOUS at 05:30

## 2022-04-26 RX ADMIN — OXYCODONE HYDROCHLORIDE 10 MG: 5 TABLET ORAL at 19:52

## 2022-04-26 RX ADMIN — Medication 10 MG: at 03:12

## 2022-04-26 RX ADMIN — ROCURONIUM BROMIDE 30 MG: 50 INJECTION, SOLUTION INTRAVENOUS at 03:11

## 2022-04-26 RX ADMIN — DEXAMETHASONE SODIUM PHOSPHATE 8 MG: 4 INJECTION, SOLUTION INTRAMUSCULAR; INTRAVENOUS at 03:18

## 2022-04-26 RX ADMIN — SENNOSIDES AND DOCUSATE SODIUM 2 TABLET: 8.6; 5 TABLET ORAL at 22:19

## 2022-04-26 RX ADMIN — KETOROLAC TROMETHAMINE 30 MG: 30 INJECTION, SOLUTION INTRAMUSCULAR; INTRAVENOUS at 16:41

## 2022-04-26 RX ADMIN — HYDROMORPHONE HYDROCHLORIDE 0.2 MG: 1 INJECTION, SOLUTION INTRAMUSCULAR; INTRAVENOUS; SUBCUTANEOUS at 08:10

## 2022-04-26 RX ADMIN — SODIUM CHLORIDE: 9 INJECTION, SOLUTION INTRAVENOUS at 02:47

## 2022-04-26 RX ADMIN — SODIUM CITRATE AND CITRIC ACID MONOHYDRATE 30 ML: 500; 334 SOLUTION ORAL at 02:20

## 2022-04-26 RX ADMIN — OXYCODONE HYDROCHLORIDE 10 MG: 5 TABLET ORAL at 15:16

## 2022-04-26 RX ADMIN — FENTANYL CITRATE 25 MCG: 50 INJECTION INTRAMUSCULAR; INTRAVENOUS at 10:38

## 2022-04-26 RX ADMIN — PHENYLEPHRINE HYDROCHLORIDE 200 MCG: 10 INJECTION INTRAVENOUS at 03:48

## 2022-04-26 RX ADMIN — LIDOCAINE HYDROCHLORIDE 100 MG: 20 INJECTION, SOLUTION INFILTRATION; PERINEURAL at 02:59

## 2022-04-26 RX ADMIN — PHENYLEPHRINE HYDROCHLORIDE 0.5 MCG/KG/MIN: 10 INJECTION INTRAVENOUS at 03:10

## 2022-04-26 RX ADMIN — HYDROMORPHONE HYDROCHLORIDE 0.2 MG: 1 INJECTION, SOLUTION INTRAMUSCULAR; INTRAVENOUS; SUBCUTANEOUS at 08:03

## 2022-04-26 RX ADMIN — BUPIVACAINE 20 ML: 13.3 INJECTION, SUSPENSION, LIPOSOMAL INFILTRATION at 07:00

## 2022-04-26 RX ADMIN — ROCURONIUM BROMIDE 20 MG: 50 INJECTION, SOLUTION INTRAVENOUS at 05:00

## 2022-04-26 RX ADMIN — HYDROMORPHONE HYDROCHLORIDE 0.2 MG: 1 INJECTION, SOLUTION INTRAMUSCULAR; INTRAVENOUS; SUBCUTANEOUS at 07:52

## 2022-04-26 RX ADMIN — LIDOCAINE PATCH 4% 2 PATCH: 40 PATCH TOPICAL at 14:06

## 2022-04-26 RX ADMIN — CALCIUM CHLORIDE 1 G: 100 INJECTION, SOLUTION INTRAVENOUS at 04:41

## 2022-04-26 RX ADMIN — SUGAMMADEX 200 MG: 100 INJECTION, SOLUTION INTRAVENOUS at 07:04

## 2022-04-26 RX ADMIN — FENTANYL CITRATE 50 MCG: 50 INJECTION, SOLUTION INTRAMUSCULAR; INTRAVENOUS at 03:29

## 2022-04-26 RX ADMIN — ACETAMINOPHEN 975 MG: 325 TABLET ORAL at 16:31

## 2022-04-26 RX ADMIN — CEFAZOLIN 2 G: 1 INJECTION, POWDER, FOR SOLUTION INTRAMUSCULAR; INTRAVENOUS at 05:00

## 2022-04-26 RX ADMIN — MIDAZOLAM 2 MG: 1 INJECTION INTRAMUSCULAR; INTRAVENOUS at 04:35

## 2022-04-26 RX ADMIN — PHENYLEPHRINE HYDROCHLORIDE 200 MCG: 10 INJECTION INTRAVENOUS at 04:02

## 2022-04-26 RX ADMIN — METRONIDAZOLE 500 MG: 500 INJECTION, SOLUTION INTRAVENOUS at 05:19

## 2022-04-26 RX ADMIN — TRANEXAMIC ACID 1 G: 1 INJECTION, SOLUTION INTRAVENOUS at 03:05

## 2022-04-26 RX ADMIN — HYDROMORPHONE HYDROCHLORIDE 0.2 MG: 1 INJECTION, SOLUTION INTRAMUSCULAR; INTRAVENOUS; SUBCUTANEOUS at 14:29

## 2022-04-26 RX ADMIN — FENTANYL CITRATE 25 MCG: 50 INJECTION INTRAMUSCULAR; INTRAVENOUS at 07:46

## 2022-04-26 RX ADMIN — PHENYLEPHRINE HYDROCHLORIDE 200 MCG: 10 INJECTION INTRAVENOUS at 04:12

## 2022-04-26 RX ADMIN — HYDROMORPHONE HYDROCHLORIDE 0.2 MG: 1 INJECTION, SOLUTION INTRAMUSCULAR; INTRAVENOUS; SUBCUTANEOUS at 08:35

## 2022-04-26 RX ADMIN — Medication 10 MG: at 03:16

## 2022-04-26 RX ADMIN — ONDANSETRON 4 MG: 2 INJECTION INTRAMUSCULAR; INTRAVENOUS at 06:27

## 2022-04-26 RX ADMIN — ALBUMIN HUMAN: 0.05 INJECTION, SOLUTION INTRAVENOUS at 04:00

## 2022-04-26 RX ADMIN — ROCURONIUM BROMIDE 20 MG: 50 INJECTION, SOLUTION INTRAVENOUS at 03:44

## 2022-04-26 RX ADMIN — FENTANYL CITRATE 25 MCG: 50 INJECTION INTRAMUSCULAR; INTRAVENOUS at 07:42

## 2022-04-26 RX ADMIN — METHYLERGONOVINE MALEATE 200 MCG: 0.2 INJECTION INTRAVENOUS at 03:08

## 2022-04-26 RX ADMIN — Medication 2 G: at 02:40

## 2022-04-26 RX ADMIN — SODIUM CHLORIDE, POTASSIUM CHLORIDE, SODIUM LACTATE AND CALCIUM CHLORIDE: 600; 310; 30; 20 INJECTION, SOLUTION INTRAVENOUS at 05:52

## 2022-04-26 RX ADMIN — FENTANYL CITRATE 25 MCG: 50 INJECTION INTRAMUSCULAR; INTRAVENOUS at 12:24

## 2022-04-26 RX ADMIN — ROCURONIUM BROMIDE 10 MG: 50 INJECTION, SOLUTION INTRAVENOUS at 05:27

## 2022-04-26 RX ADMIN — ACETAMINOPHEN 975 MG: 325 TABLET ORAL at 22:20

## 2022-04-26 RX ADMIN — OXYTOCIN-SODIUM CHLORIDE 0.9% IV SOLN 30 UNIT/500ML 600 ML/HR: 30-0.9/5 SOLUTION at 03:04

## 2022-04-26 RX ADMIN — FENTANYL CITRATE 50 MCG: 50 INJECTION, SOLUTION INTRAMUSCULAR; INTRAVENOUS at 06:01

## 2022-04-26 RX ADMIN — SODIUM CHLORIDE: 9 INJECTION, SOLUTION INTRAVENOUS at 04:15

## 2022-04-26 RX ADMIN — SODIUM CHLORIDE, POTASSIUM CHLORIDE, SODIUM LACTATE AND CALCIUM CHLORIDE: 600; 310; 30; 20 INJECTION, SOLUTION INTRAVENOUS at 02:47

## 2022-04-26 RX ADMIN — FENTANYL CITRATE 50 MCG: 50 INJECTION, SOLUTION INTRAMUSCULAR; INTRAVENOUS at 05:15

## 2022-04-26 RX ADMIN — PHENYLEPHRINE HYDROCHLORIDE 200 MCG: 10 INJECTION INTRAVENOUS at 03:55

## 2022-04-26 RX ADMIN — FENTANYL CITRATE 100 MCG: 50 INJECTION, SOLUTION INTRAMUSCULAR; INTRAVENOUS at 03:06

## 2022-04-26 RX ADMIN — CYCLOBENZAPRINE HYDROCHLORIDE 10 MG: 5 TABLET, FILM COATED ORAL at 13:32

## 2022-04-26 RX ADMIN — HYDROMORPHONE HYDROCHLORIDE 0.5 MG: 1 INJECTION, SOLUTION INTRAMUSCULAR; INTRAVENOUS; SUBCUTANEOUS at 07:21

## 2022-04-26 RX ADMIN — FENTANYL CITRATE 25 MCG: 50 INJECTION INTRAMUSCULAR; INTRAVENOUS at 09:57

## 2022-04-26 RX ADMIN — HYDROMORPHONE HYDROCHLORIDE 0.2 MG: 1 INJECTION, SOLUTION INTRAMUSCULAR; INTRAVENOUS; SUBCUTANEOUS at 07:58

## 2022-04-26 RX ADMIN — OXYCODONE HYDROCHLORIDE 5 MG: 5 TABLET ORAL at 10:34

## 2022-04-26 ASSESSMENT — ACTIVITIES OF DAILY LIVING (ADL)
DIFFICULTY_EATING/SWALLOWING: NO
ADLS_ACUITY_SCORE: 12
CONCENTRATING,_REMEMBERING_OR_MAKING_DECISIONS_DIFFICULTY: NO
WEAR_GLASSES_OR_BLIND: YES
ADLS_ACUITY_SCORE: 12
FALL_HISTORY_WITHIN_LAST_SIX_MONTHS: NO
ADLS_ACUITY_SCORE: 12
CHANGE_IN_FUNCTIONAL_STATUS_SINCE_ONSET_OF_CURRENT_ILLNESS/INJURY: NO
TOILETING_ISSUES: NO
ADLS_ACUITY_SCORE: 12
ADLS_ACUITY_SCORE: 12
DRESSING/BATHING_DIFFICULTY: NO
VISION_MANAGEMENT: GLASSES
ADLS_ACUITY_SCORE: 12
ADLS_ACUITY_SCORE: 12
WALKING_OR_CLIMBING_STAIRS_DIFFICULTY: NO
NUMBER_OF_TIMES_PATIENT_HAS_FALLEN_WITHIN_LAST_SIX_MONTHS: 0
DOING_ERRANDS_INDEPENDENTLY_DIFFICULTY: NO

## 2022-04-26 ASSESSMENT — ENCOUNTER SYMPTOMS
DYSRHYTHMIAS: 1
SEIZURES: 0

## 2022-04-26 NOTE — PROVIDER NOTIFICATION
04/26/22 1500   Provider Notification   Provider Name/Title Dr. Samuels   Method of Notification Electronic Page   Notification Reason Status Update   Could you reorder the PRN Dilaudid? The pt. is asking for some sleeping medication for tonight. FYI the patient had a BP that was 148/70 and denies any pre-e s/s.

## 2022-04-26 NOTE — PROVIDER NOTIFICATION
04/26/22 0939   Provider Notification   Provider Name/Title Dr. Dominguez   Method of Notification Electronic Page   Notification Reason Status Update   The patient and family would like a doctor to come and talk to them about the surgery. Could you please come talk to them?

## 2022-04-26 NOTE — PROGRESS NOTES
Received page form RN that patient has questions about her surgery. Went to room to discuss.     Reviewed operative course along with findings of invasion of placenta into bladder with resulting cystostomies that were repaired and need for nguyen catheter. All questions were answered.     Ongoing pain, will review and adjust pain meds.     Carmenza Samuels MD MSc  OBGYN Resident, PGY3  April 26, 2022, 3:59 PM

## 2022-04-26 NOTE — PLAN OF CARE
Patient vitals stable and afebrile. The patient transferred to room 483 at 9:38am from the main OR PACU. The patient has been having pain during the whole shift and the RN has been treating the pain with the pain medications ordered and talking with the providers. The patient's nguyen catheter is patent and flushed. Catheter cares done and output adequate. Urine is a dark red color and will continue to monitor. INC is WNL and not drainage. Patient is independently using the breast pump. The patient's spouse and sister here for support. Will continue plan of care.

## 2022-04-26 NOTE — PROGRESS NOTES
PACU to Inpatient Nursing Handoff    Patient Bhumi Lorenzo is a 32 year old female who speaks English.   Procedure Procedure(s):   section, Hysterectomy, Bilateral Salphingectomy, Repair of Cystostomy and Cystoscopy  Hysterectomy, Bilateral Salpingectomy, Repair of Cystostomy and Cystoscopy  Cystoscopy   Surgeon(s) Primary: Loreto Gutiérrez MD  Assisting: Jess Phelps MD  Resident - Assisting: Sydni Lugo MD     No Known Allergies    Isolation  Special Precautions-COVID-19     Past Medical History   has a past medical history of Abnormal Pap smear of cervix (2013), Abnormal Pap smear of cervix (10/05/2011), Fibroadenoma of breast determined by biopsy (2015), and Placenta accreta ().    Anesthesia * No anesthesia type entered *   Dermatome Level     Preop Meds acetaminophen (Tylenol) - time given: 0220  Bicitra - time given: 0220   Nerve block {nerve block type, location, agent, time given:594206}   Intraop Meds dexamethasone (Decadron)  fentanyl (Sublimaze): 400 mcg total  ondansetron (Zofran): last given at 0627   Local Meds No   Antibiotics cefazolin (Ancef) - last given at 0500  Flagyl - last given at 0519     Pain Patient Currently in Pain: no   PACU meds  ***   PCA / epidural No   Capnography     Telemetry     Inpatient Telemetry Monitor Ordered? {Yes/No:717883}        Labs Glucose Lab Results   Component Value Date     2022       Hgb Lab Results   Component Value Date    HGB 10.0 2022    HGB 10.4 2022       INR Lab Results   Component Value Date    INR 1.10 2022      PACU Imaging Not applicable     Wound/Incision Incision/Surgical Site 22 Anterior Abdomen (Active)   Incision Assessment WDL 22 0632   Closure Sutures;Liquid bandage 22 0632   Dressing Intervention Clean, dry, intact;Open to air / No Dressing 22 0632   Number of days: 0      CMS        Equipment Not applicable   Other LDA       IV Access  "Peripheral IV 04/26/22 Anterior;Right Hand (Active)   Site Assessment WDL 04/26/22 0220   Line Status Infusing 04/26/22 0220   Phlebitis Scale 0-->no symptoms 04/26/22 0220   Infiltration Scale 0 04/26/22 0220   Number of days: 0       Peripheral IV 04/26/22 Anterior;Right Upper forearm (Active)   Site Assessment WDL 04/26/22 0220   Line Status Saline locked 04/26/22 0220   Phlebitis Scale 0-->no symptoms 04/26/22 0220   Infiltration Scale 0 04/26/22 0220   Number of days: 0       Peripheral IV 04/26/22 Anterior;Left Upper forearm (Active)   Site Assessment L 04/26/22 0220   Line Status Saline locked 04/26/22 0220   Dressing Intervention New dressing  04/26/22 0220   Phlebitis Scale 0-->no symptoms 04/26/22 0220   Infiltration Scale 0 04/26/22 0220   Number of days: 0       Peripheral IV 04/26/22 Left Hand (Active)   Number of days: 0      Blood Products *** EBL 4500 mL   Intake/Output    Drains / Adams Urethral Catheter 04/26/22 Double-lumen;Latex 16 fr (Active)   Number of days: 0      Time of void PreOp Void Prior to Procedure: 2330 (04/26/22 0211)    PostOp      Diapered? No   Bladder Scan     PO    {PO Intake:903664}     Vitals    B/P: 113/79  T:         P:             R: 14  O2:  SpO2: 100 %                   Family/support present significant other and sister   Patient belongings     Patient transported on cart   DC meds/scripts (obs/outpt) Not applicable   Inpatient Pain Meds Released? {Yes/No:470342::\"Yes\"}       Special needs/considerations None   Tasks needing completion None       Chayo Montemayor, NURIA  ASCOM ***    "

## 2022-04-26 NOTE — PROVIDER NOTIFICATION
04/26/22 1215   Provider Notification   Provider Name/Title Dr. Dominguez   Method of Notification Electronic Page   Notification Reason Status Update   Could you please come and talk to the patient about the surgery? they are wondering about the extent of the placenta acerta.

## 2022-04-26 NOTE — BRIEF OP NOTE
[facility]   BRIEF OPERATIVE NOTE:  SECTION     Surgery Date:  2022   Surgeon(s):   MD Jess Miranda MD      Assistants:    MD Sydni Almodovar MD, [facility], PGY-3    Preoperative Diagnoses:  - at 34w6d   -History of  section x3  -Suspected placenta accreta spectrum     Postoperative diagnoses:  - at 34w6d, now delivered    Procedure performed:    - Repeat classical  section via Vertical midline skin incision with single layer uterine closure  - Bilateral salpingectomy   - Cystoscopy         Anesthesia: GETA  QBL: 5000 mL  Fluid replacement: 2600 mL crystalloid.   UOP: 400 mL prior to hysterectomy; then not measured due to cystotomy   Specimens: Placenta, cord gases   Complications: None apparent       Operative findings:   -Single, liveborn male infant at 0303 hours on 2022. Apgars of 7 and 6 and 8 at one and five and ten minutes. Birth weight: pending. Fetal presentation: Vertex. Amniotic fluid: Clear.    -Cord gases pending   -Placenta growng through the uterine wall   -Uterus with placenta growing through entire anterior ELVIA and into the bladder   -Normal fallopian tubes, ovaries.   -Mild recto-fascial adhesions. No intraabdominal adhesions.  No abdominal wall adhesions.         Transferred to PACU in stable condition.     Sydni Lugo MD, [facility] Obstetrics and Gynecology - PGY-3

## 2022-04-26 NOTE — ANESTHESIA CARE TRANSFER NOTE
[facility]    Procedure: Procedure(s):   section, Hysterectomy, Bilateral Salphingectomy, Repair of Cystostomy and Cystoscopy  Hysterectomy, Bilateral Salpingectomy, Repair of Cystostomy and Cystoscopy  Cystoscopy       Diagnosis: Placenta previa with hemorrhage in third trimester [O44.13]  Diagnosis Additional Information: No value filed.    Anesthesia Type:   No value filed.     Note:    Oropharynx: oropharynx clear of all foreign objects and spontaneously breathing  Level of Consciousness: drowsy  Oxygen Supplementation: face mask  Level of Supplemental Oxygen (L/min / FiO2): 6  Independent Airway: airway patency satisfactory and stable  Dentition: dentition unchanged  Vital Signs Stable: post-procedure vital signs reviewed and stable  Report to RN Given: handoff report given  Patient transferred to: PACU    Handoff Report: Identifed the Patient, Identified the Reponsible Provider, Reviewed the pertinent medical history, Discussed the surgical course, Reviewed Intra-OP anesthesia mangement and issues during anesthesia, Set expectations for post-procedure period and Allowed opportunity for questions and acknowledgement of understanding      Vitals:  Vitals Value Taken Time   /72 22 0723   Temp 36.6    Pulse 118 22 0729   Resp 18 22 0729   SpO2 99 % 22 0729   Vitals shown include unvalidated device data.    Electronically Signed By: JEREMÍAS BARKLEY APRN CRNA  2022  7:30 AM

## 2022-04-26 NOTE — ANESTHESIA PROCEDURE NOTES
TAP Procedure Note    Pre-Procedure   Staff -        Anesthesiologist:  Johnna Guevara MD       Resident/Fellow: Chris Fam MD       Performed By: with residents       Procedure performed by resident/fellow/CRNA in presence of a teaching physician.         Location: pre-op       Pre-Anesthestic Checklist: patient identified, IV checked, site marked, risks and benefits discussed, informed consent, monitors and equipment checked, pre-op evaluation, at physician/surgeon's request and post-op pain management  Timeout:       Correct Patient: Yes        Correct Procedure: Yes        Correct Site: Yes        Correct Position: Yes        Correct Laterality: Yes        Site Marked: Yes  Procedure Documentation  Procedure: TAP       Diagnosis: POST OPERATIVE PAIN       Laterality: bilateral       Patient Position: supine       Patient Prep/Sterile Barriers: sterile gloves, mask       Skin prep: Chloraprep       Needle Type: short bevel       Needle Gauge: 21.        Needle Length (millimeters): 110        Ultrasound guided       1. Ultrasound was used to identify targeted nerve, plexus, vascular marker, or fascial plane and place a needle adjacent to it in real-time.       2. Ultrasound was used to visualize the spread of anesthetic in close proximity to the above referenced structure.       3. A permanent image is entered into the patient's record.    Assessment/Narrative         The placement was negative for: blood aspirated, painful injection and site bleeding       Paresthesias: No.       Bolus given via needle..        Secured via.        Insertion/Infusion Method: Single Shot       Complications: none       Injection made incrementally with aspirations every 5 mL.    Medication(s) Administered   Bupivacaine 0.25% PF (Infiltration) - Infiltration   20 mL - 4/26/2022 7:00:00 AM  Bupivacaine liposome (Exparel) 1.3% LA inj susp (Infiltration) - Infiltration   20 mL - 4/26/2022 7:00:00 AM

## 2022-04-26 NOTE — DISCHARGE SUMMARY
[facility]   Discharge Summary    Bhumi Johnson MRN# [id number]   Age: 32 year old YOB: 1990     Date of Admission:  2022  Date of Discharge::  22  Admitting Physician:  Loreto Gutiérrez MD  Discharge Physician:  Yael Dominguez MD             Admission Diagnoses:   - Intrauterine pregnancy at 34w6d  - Vaginal bleeding  - Complete placenta previa with placenta accreta spectrum  - History of  section x3  - Shortened cervix, last 25.2 on 22  - History of moderate mitral valve regurgitation  - Palpitations in pregnancy           Discharge Diagnosis:     Same, delivered   - Placenta accreta spectrum with invasion of the bladder  - Massive postpartum hemorrhage          Procedures:     Procedure(s): - Repeat classical  section via Vertical midline skin incision with single layer uterine closure  - Hysterectomy and Bilateral salpingectomy   - Cystotomy repair   - Cystoscopy   - General endotracheal anesthesia                Medications Prior to Admission:     Medications Prior to Admission   Medication Sig Dispense Refill Last Dose    Prenatal Vit-Fe Fumarate-FA (PRENATAL VITAMIN PO) Take 1 tablet by mouth daily                Discharge Medications:        Review of your medicines        START taking        Dose / Directions   acetaminophen 325 MG tablet  Commonly known as: TYLENOL  Used for: Delivery by  hysterectomy      Dose: 650 mg  Take 2 tablets (650 mg) by mouth every 6 hours as needed for mild pain Start after Delivery.  Quantity: 100 tablet  Refills: 0     Aspirin 81 MG Caps  Used for: COVID-19 affecting pregnancy in third trimester      Dose: 81 mg  Take 81 mg by mouth daily  Quantity: 42 capsule  Refills: 0     ibuprofen 600 MG tablet  Commonly known as: ADVIL/MOTRIN  Used for: Delivery by  hysterectomy      Dose: 600 mg  Take 1 tablet (600 mg) by mouth every 6 hours as needed for moderate pain Start after  delivery  Quantity: 60 tablet  Refills: 0     nitroFURantoin macrocrystal-monohydrate 100 MG capsule  Commonly known as: MACROBID  Used for: Delivery by  hysterectomy      Dose: 100 mg  Start taking on: May 12, 2022  Take 1 capsule (100 mg) by mouth 2 times daily for 3 days  Quantity: 6 capsule  Refills: 0     oxyCODONE 5 MG tablet  Commonly known as: ROXICODONE  Used for: Delivery by  hysterectomy      Dose: 5-10 mg  Take 1-2 tablets (5-10 mg) by mouth every 6 hours as needed for moderate to severe pain  Quantity: 12 tablet  Refills: 0     polyethylene glycol 17 GM/Dose powder  Commonly known as: MIRALAX  Used for: Delivery by  hysterectomy, Placenta accreta in second trimester      Dose: 1 capful  Take 17 g (1 capful) by mouth daily  Quantity: 507 g  Refills: 1     senna-docusate 8.6-50 MG tablet  Commonly known as: SENOKOT-S/PERICOLACE  Used for: Delivery by  hysterectomy      Dose: 1 tablet  Take 1 tablet by mouth daily Start after delivery.  Quantity: 100 tablet  Refills: 0            CONTINUE these medicines which have NOT CHANGED        Dose / Directions   PRENATAL VITAMIN PO      Dose: 1 tablet  Take 1 tablet by mouth daily  Refills: 0               Where to get your medicines        These medications were sent to [facility] [place], [place] - [address]  [address] [address]      Phone: [phone]   acetaminophen 325 MG tablet  Aspirin 81 MG Caps  ibuprofen 600 MG tablet  nitroFURantoin macrocrystal-monohydrate 100 MG capsule  oxyCODONE 5 MG tablet  polyethylene glycol 17 GM/Dose powder  senna-docusate 8.6-50 MG tablet               Consultations:   Anesthesia  GYN Oncology (intra-operative)  NICU          Brief Admission History:   Bhumi Lorenzo is a 32 year old  who presented on 2022 at 34w6d by helicopter from [facility] with bleeding in the setting of placenta previa and history of CS x3. Recommended   section with hysterectomy due to very high risk for PAS; likely higher than 61%; CS x3 prior. Imaging is very concerning for this; thus we discussed the plan is to perform hysterectomy; she is understanding of this plan and was well counseled antenatally and she is done with childbearing. Discussed with the patient the higher relative risk of including maternofetal compromise, need for transfusion, DIC, bladder and bowel injury.         Intraoperative course   EBL 5000 mL.  See operative report for details.       Operative findings:   -Single, liveborn male infant at 0303 hours on 4/26/2022. Apgars of 7 and 6 and 8 at one and five and ten minutes. Birth weight: 2490g. Fetal presentation: Vertex. Amniotic fluid: Clear.    -Cord gases: arterial pH 7.23, BE -4.9  -Placenta invading through the uterine wall with invasion of the placenta into the ELVIA, left parametrium and the bladder (photo scanned into Media tab).  -Normal appearing fallopian tubes, ovaries.   -Mild recto-fascial adhesions. No intraabdominal adhesions. No abdominal wall adhesions.   -Pre-op Hgb 12.3 > EBL 5000mL > (20 mcg methergine, 1g TXA given intra-op) > intraoperative hemoglobin 11.2 > 5u pRBC, 2u FFP > intraoperative hemoglobin 10.0.   -Due to hemorrhage, ancef 2mg was re-dosed; she was also given 500mg flagyl. No concern for bowel perforation or injury. Bowel was run by gynonc.      See Dr. Salgado's Operative Note for Panel 2: Hysterectomy with bilateral salpingectomy and cystoscopy        Postpartum Course   The patient's hospital course was unremarkable.  She recovered as anticipated and experienced no post-operative complications.  On discharge, her pain was well controlled. No vaginal bleeding.  Clear yellow urine in nguyen, will remain in place for 14 days.  Ambulating well, tolerating a normal diet, and has resumption of bowel function.  No fever or significant wound drainage.  Pumping well.  Infant is stable in the NICU. She was discharged  on post-partum day #5 to room in with her infant.    Post-partum hemoglobin:   Hemoglobin   Date Value Ref Range Status   04/26/2022 11.9 11.7 - 15.7 g/dL Final     Contraception: s/p hysterectomy  Rh positive, no Rhogam indicated  Rubella immune, no MMR indicated          Discharge Instructions and Follow-Up:     Discharge diet: Regular   Discharge activity: No lifting greater than 20 lbs, pushing, pulling, or other strenuous activity for 6 weeks. Pelvic rest for 6 weeks including no sexual intercourse, tampons, or douching. No driving until you can slam on the breaks without pain or while on narcotic pain medications.    Discharge follow-up: Follow up with primary OB for routine postpartum visit in and 6 weeks  Cystogram when nguyen removed after 14 days   Wound care: Keep incision clean and dry           Discharge Disposition:     Discharged to room in with infant     Ariane Colorado MD  Ob/Gyn Resident, PGY-1  05/01/2022 8:03 AM    I saw and evaluated this patient prior to discharge. I discussed the patient with the resident and agree with plan of care as documented in the resident note.   I personally reviewed vital signs, medications, labs, and imaging.   I personally spent 10 minutes on discharge activities.  aYel Dominguez MD

## 2022-04-26 NOTE — ANESTHESIA PREPROCEDURE EVALUATION
Anesthesia Pre-Procedure Evaluation    Patient: Bhumi Lorenzo   MRN: [id number] : 1990        Procedure : Procedure(s):   section  Hysterectomy total abdominal          Past Medical History:   Diagnosis Date     Abnormal Pap smear of cervix 2013     Abnormal Pap smear of cervix 10/05/2011    Colposcopy 10/24/2011: benign     Fibroadenoma of breast determined by biopsy 2015    Right     Placenta accreta       Past Surgical History:   Procedure Laterality Date      SECTION        SECTION  05/15/2018      SECTION  10/18/2019     COLPOSCOPY  10/24/2011    Benign cervical epithelium     IN BREAST AUGMENTATION  2016     TONSILLECTOMY       US BREAST BIOPSY RT Right 2015    Fibroadenoma      No Known Allergies   Social History     Tobacco Use     Smoking status: Never Smoker     Smokeless tobacco: Never Used   Substance Use Topics     Alcohol use: Not on file      Wt Readings from Last 1 Encounters:   22 78.5 kg (173 lb 1.6 oz)        Anesthesia Evaluation            ROS/MED HX  ENT/Pulmonary:  - neg pulmonary ROS     Neurologic:  - neg neurologic ROS  (-) no seizures and no CVA   Cardiovascular:     (+) -----dysrhythmias (Recent SVT requiring cardioversion at OSH), valvular problems/murmurs type: MR Mod - Severe, no echo on file.  (-) PIH   METS/Exercise Tolerance:     Hematologic:     (+) anemia,     Musculoskeletal:  - neg musculoskeletal ROS     GI/Hepatic:     (+) GERD,     Renal/Genitourinary:  - neg Renal ROS     Endo:  - neg endo ROS     Psychiatric/Substance Use:  - neg psychiatric ROS     Infectious Disease:  - neg infectious disease ROS     Malignancy:  - neg malignancy ROS     Other:   Pregnancy c/b placenta accreta - active bleeding at OSH         Physical Exam    Airway        Mallampati: II   TM distance: > 3 FB   Neck ROM: full   Mouth opening: > 3 cm    Respiratory Devices and Support         Dental  no notable dental history          Cardiovascular           (+) murmur       Pulmonary   pulmonary exam normal                OUTSIDE LABS:  CBC:   Lab Results   Component Value Date    WBC 9.3 04/26/2022    HGB 12.4 04/26/2022    HCT 35.7 04/26/2022     04/26/2022     BMP: No results found for: NA, POTASSIUM, CHLORIDE, CO2, BUN, CR, GLC  COAGS:   Lab Results   Component Value Date    PTT 31 04/26/2022    INR 0.99 04/26/2022    FIBR 563 (H) 04/26/2022     POC: No results found for: BGM, HCG, HCGS  HEPATIC: No results found for: ALBUMIN, PROTTOTAL, ALT, AST, GGT, ALKPHOS, BILITOTAL, BILIDIRECT, MEI  OTHER: No results found for: PH, LACT, A1C, ABBE, PHOS, MAG, LIPASE, AMYLASE, TSH, T4, T3, CRP, SED    Anesthesia Plan    ASA Status:  3, emergent       Anesthesia Type: General.     - Airway: ETT   Induction: RSI.   Maintenance: Balanced.        Consents    Anesthesia Plan(s) and associated risks, benefits, and realistic alternatives discussed. Questions answered and patient/representative(s) expressed understanding.    - Discussed:     - Discussed with:  Patient         Postoperative Care            Comments:                Iain Barr MD

## 2022-04-26 NOTE — ANESTHESIA PROCEDURE NOTES
Airway       Patient location during procedure: OR       Procedure Start/Stop Times: 4/26/2022 3:02 AM and 4/26/2022 3:03 AM  Staff -        Anesthesiologist:  Maria Teresa Weir MD       CRNA: Bashir Orourke APRN CRNA       Performed By: CRNAIndications and Patient Condition       Indications for airway management: jesus-procedural, airway protection and altered level of consciousness       Induction type:intravenous       Mask difficulty assessment: 1 - vent by mask    Final Airway Details       Final airway type: endotracheal airway       Successful airway: ETT - single  Endotracheal Airway Details        ETT size (mm): 6.5       Cuffed: yes       Successful intubation technique: video laryngoscopy       VL Blade Size: MAC 3       Grade View of Cords: 1       Adjucts: stylet       Position: Right       Measured from: lips       Secured at (cm): 21       Bite block used: None    Post intubation assessment        Placement verified by: capnometry, equal breath sounds and chest rise        Number of attempts at approach: 1       Secured with: pink tape       Ease of procedure: easy       Dentition: Intact and Unchanged    Medication(s) Administered   Medication Administration Time: 4/26/2022 3:02 AM

## 2022-04-26 NOTE — OP NOTE
[facility]   OPERATIVE NOTE:  SECTION       Surgery Date:  2022   Surgeon(s):   MD Jess Miranda MD Collen Rivard, MD     Assistants:    MD Sydni Almodovar MD, [facility], PGY-3     Preoperative Diagnoses:  - at 34w6d   -History of  section x3  -Suspected placenta accreta spectrum      Postoperative diagnoses:  - at 34w6d, now delivered  -Placenta accreta spectrum noted on entry into abdomen     Procedure performed:    Panel 1:   - Repeat classical  section via Vertical midline skin incision with single layer uterine closure  Panel 2:   - Hysterectomy and Bilateral salpingectomy   - Cystotomy repair   - Cystoscopy        Anesthesia: GETA  QBL: 5000 mL  Fluid replacement: 2600 mL crystalloid.   UOP: 400 mL prior to hysterectomy; then not measured due to cystotomies  Specimens: Placenta, cord gases   Complications: None apparent        Operative findings:   -Single, liveborn male infant at 0303 hours on 2022. Apgars of 7 and 6 and 8 at one and five and ten minutes. Birth weight: 2490g. Fetal presentation: Vertex. Amniotic fluid: Clear.    -Cord gases: arterial pH 7.23, BE -4.9  -Placenta invading through the uterine wall with invasion of the placenta into the ELVIA, left parametrium and the bladder (photo scanned into Media tab).  -Normal appearing fallopian tubes, ovaries.   -Mild recto-fascial adhesions. No intraabdominal adhesions. No abdominal wall adhesions.   -Pre-op Hgb 12.3 > EBL 5000mL > (20 mcg methergine, 1g TXA given intra-op) > intraoperative hemoglobin 11.2 > 5u pRBC, 2u FFP > intraoperative hemoglobin 10.0.   -Due to hemorrhage, ancef 2mg was re-dosed; she was also given 500mg flagyl. No concern for bowel perforation or injury. Bowel was run by gynonc.     See Dr. Salgado's Operative Note for Panel 2: Hysterectomy with bilateral salpingectomy and cystoscopy     Indication: Bhumi Lorenoz  is a 32 year old, , who was admitted at 34w6d by 7w6d US who presents to [facility] as a transfer from [facility] (via helicopter) today with bleeding in the setting of placenta previa and history of CS x3.  Due to high index of suspicion and bleeding, she was consented for a -hysterectomy with bilateral salpingectomy; she agreed to proceed.The risks, benefits, and alternatives of  delivery were explained and the patient agreed to proceed.     Procedure details:  After obtaining informed consent, the patient was taken to the operating room. An appropriate patient safety Time Out was conducted.  She received 2g of ancef prior to the skin incision.  She was placed in the dorsal lithotomy position with a leftward tilt and prepped and draped in the usual sterile fashion.     After induction of GETA and airway security, the abdomen was entered through a vertical midline skin incision.   The skin incision was made sharply and carried through the subcutaneous tissue to the fascia.  Fascia was incised in the midline and extended laterally with blunt dissection.     The peritoneum was entered bluntly and the opening extended by digital dissection with care to avoid the bladder. A bladder blade was placed.  Hysterotomy made in classical fashion with scalpel and extended with digital pressure. The infant's head was noted to be in the vertex position. It was elevated to the level of the hysterotomy and was delivered atraumatically; shoulders delivered easily thereafter. The cord was doubly clamped immediately  and cut and the infant was handed off to the waiting NICU staff. A segment of the cord was cut and set aside for cord gases.     The placenta was noted to be invading into the parametrium, ELVIA and bladder. Umbilical cord was clamped with umbilcial clamp and tucked back into the uterus.  The uterus was exteriorized from the abdomen and quickly closed with a single layer of 0-vicryl.  Pitocin was given  through the running IV.  1g TXA was administered. 200 mcg methergine also given. Her antibiotics were redosed and she was given flagyl as well.     At this point, gynecologic oncology took over. See Dr. Meredith Salgado's operative note.      Zenaida Vergara and Brock were scrubbed and present for the the  delivery.      JEANINE German MD  Obstetrics and Gyncology, PGY-3  2022, 2:28 AM      I was present and scrubbed for entire procedure.   Loreto Gutiérrez MD

## 2022-04-26 NOTE — ANESTHESIA POSTPROCEDURE EVALUATION
Patient: Bhumi Johnson    Procedure: Procedure(s):   section, Hysterectomy, Bilateral Salphingectomy, Repair of Cystostomy and Cystoscopy  Hysterectomy, Bilateral Salpingectomy, Repair of Cystostomy and Cystoscopy  Cystoscopy       Anesthesia Type:  No value filed.    Note:  Disposition: Admission   Postop Pain Control: Uneventful            Sign Out: Well controlled pain   PONV: No   Neuro/Psych: Uneventful            Sign Out: Acceptable/Baseline neuro status   Airway/Respiratory: Uneventful            Sign Out: Acceptable/Baseline resp. status   CV/Hemodynamics: Uneventful            Sign Out: Acceptable CV status; No obvious hypovolemia; No obvious fluid overload   Other NRE: NONE   DID A NON-ROUTINE EVENT OCCUR? No           Last vitals:  Vitals Value Taken Time   /83 22 0845   Temp 36.7  C (98  F) 22 0815   Pulse 83 22 0856   Resp 24 22 0856   SpO2 97 % 22 0856   Vitals shown include unvalidated device data.    Electronically Signed By: Johnna Guevara MD  2022  2:53 PM

## 2022-04-26 NOTE — OP NOTE
Gynecologic Oncology Operative Report    Bhumi Lorenzo  [id number]    PREOPERATIVE DIAGNOSIS:   at 34w6d with suspected placenta accreta spectrum    POSTOPERATIVE DIAGNOSIS:  Same, delivered    PROCEDURE:   hysterectomy, bilateral salpingectomy, repair of cystotomy x 3, ureterolysis, cystoscopy    SURGEON:  Meredith Salgado MD    ASSISTANT:  Yi Ramirez MD, fellow  Morales Lugo MD, PGY-3    ANESTHESIA:  GETA    EBL:   5 Liters    FLUIDS:  2600 cc crystalloid  5 units PRBC's  250 albumin  2 FFP    URINE OUTPUT:  Not measured due to cystotomy    SPECIMENS:  1.  Right and left fallopian tube  2.  Uterus, cervix, placenta    INDICATIONS:  Bhumi Lorenzo is 32 year old  at 34w6d with known COVID infection who was airlifted to the [place] for vaginal bleeding in the setting of known placenta previa and accreta spectrum.  We were consulted intra-operatively for assistance with the hysterectomy.    OPERATIVE FINDINGS:  There was a complete previa with evidence of placenta accreta spectrum with invasion of the placenta into the left parametrium and the bladder.  There were 3 cystotomies that were inherent to the procedure where the placenta was invading which were repaired with a water tight seal.  On cystoscopy there was a water tight repair and good efflux from the bilateral ureteral orifices.  The bilateral ovaries were grossly normal in appearance.  The bowel was run and was normal and the omentum was normal as well.  The bilateral hemidiaphragms were smooth to palpation    COMPLICATIONS:  None    DESCRIPTION OF PROCEDURE:  Following delivery of the fetus (please see separate dictation by the OB service), the uterus was closed using a running, locked 0-looped Vicryl Suture in a mass closure technique with good hemostasis of the uterus noted.  The abdominal incision was then extended inferiorly using the Bovie cautery to the pubic symphysis.  At this point the Bookwalter retractor was placed and  utilized throughout the course of the procedure.  The bowels were packed into the upper abdomen and appropriate retractors were placed.  The uterus was inspected with the findings described above.      The round ligament on the right was isolated, suture ligated with 0-vicryl and cauterized and divided with the LigaSure device.  The fallopian tube was then elevated and the mesosalpinx was cauterized and divided with the LigaSure device and amputated at the uterine cornua.  The retroperitoneum was entered and the ureter was identified.  Staying well above the ureter a window was made in the medial leaf of the broad ligament and the utero-ovarian vessels were isolated, doubly clamped, cut and suture ligated.  The same was performed in the left side.      At this point attention was turned to the bladder which was very carefully dissected off the uterine wall.  There was evidence of invasion of the placenta into the bladder at several levels and in this process there were 3 cystotomies which were inherent to the procedure. With careful dissection the bladder was taken down past the level of the cervix paying close attention to the plane between the bladder and the uterus.  Once it was felt that the bladder was safely below the uterine vessels, the uterine vessels on the right side were doubly clamped, transected and suture ligated.  In a similar fashion, we then continued with doubly clamping of the cardinals which did take several bites.  We were eventually able to enter the vagina with a straight clamp.  A similar procedure was performed on the left side, however there was significant bleeding as the placenta had invaded the parametrium on the left and thus as our clamps were placed there was bleeding from this placental tissue.  On the left we also had to perform ureterolysis in order to carefully reflect the ureter out of the way to take down the uterine artery pedicles. Once the vagina had been entered the  colpotomy was extended and the entire uterus, cervix and placenta were removed en block.  The vagina was then closed with a running stitch of 0-vicryl suture.      Given the proximity of the largest cystotomy to the trigone, we placed a wire in each ureter so we could safely repair the cystotomy without compromising the ureter.  The three separate cystotomies were closed in two layers with 3-0 Vicryl sutures.  We then performed cystoscopy and the bladder repair was found to be water tight.  We were also able to visualize brisk efflux from the bilateral ureteral orifices.  At this point the wires were removed from the ureters and the bladder was irrigated to remove the remaining small clots which were present.  All sponges were removed and the Bookwalter retractor was removed.  The abdomen was then irrigated with 4L of normal saline and good hemostasis was assured.       Fascia was closed with 0 looped PDS in 2 segments meeting in the middle. Subcutaneous tissue was reapproximated with interrupted vicryl sutures and skin closed with a running Vicryl suture and Dermabond applied.     Sponge, lap and needle counts were reported as correct x2 and instrument count was correct x1. The patient was taken to the PACU in stable condition.      Meredith Salgado MD  Gynecologic Oncology  [facility]

## 2022-04-26 NOTE — OR NURSING
Pt did well in recovery phase. Given pain medication via iv and helped greatly - pt mostly complaining of nguyen catheter area pain. Pt sondra water without nausea but became nauseated once transported to floor and had large emesis. Adb binder placed on pt. Incision dry and intact - pt states abd binder felt good to area. Report given to Macarena QUIÑONES   out in contact with 11th floor RN for update on baby, pt transported to floor

## 2022-04-26 NOTE — PROGRESS NOTES
Pt brief time in preop predominantly spent with OB team and anesthesia providers, pre op RN got minimal time with pt to complete full pre op cares and assessment. Completed consent at bedside,  to pre op after pt sent to OR, updated on POC.

## 2022-04-26 NOTE — H&P
Maternal Fetal Medicine   History and Physical    No LMP recorded. Patient is pregnant.  Estimated Date of Delivery: 2022   Gestational age:  34w6d   Obstetric History:              History of Present Illness      Bhumi Lorenzo is a 32 year old  at 34w6d by 7w6d US who presents today eva helicopter from [facility] with bleeding in the setting of placenta previa and history of CS x3. ASHOK from [place].     She woke up today at about 2300 and had a large gush of bleeding in the toilet. About 2 pads also soaked. Currently about 30-40% filled with bright red blood. She is not having contractions. Got 1L IVF en route.     She only takes PNV. She has not had any palpitations or SVT events since cardioversion. She has not done her echo yet as she tested positive for covid. No chest pain or Shortness of breath     Covid pos - does have some congestion, no respiratory sxs. Active fetus. No other surgeries aside from CS x3.     Pregnancy notable for:        1) Complete placenta previa with placenta accreta spectrum       2) History of  section x3       3) Shortened cervix, last 25.2 on 22       4) History of moderate mitral valve regurgitation       5) Palpitations in pregnancy       OB history notable for:   OB History    Para Term  AB Living   5 3 3 0 1 3   SAB IAB Ectopic Multiple Live Births   1 0 0 0 3      # Outcome Date GA Lbr Travis/2nd Weight Sex Delivery Anes PTL Lv   5 Current            4 Term 10/18/19 39w0d    CS-LTranv   ARNOLDO   3 Term 05/15/18 39w0d    CS-LTranv   ARNOLDO   2 SAB 16     SAB      1 Term      CS-LTranv   ARNOLDO       Weight  Pre-pregnancy BMI: Could not be calculated            Past Medical History     Past Medical History:   Diagnosis Date     Abnormal Pap smear of cervix 2013     Abnormal Pap smear of cervix 10/05/2011    Colposcopy 10/24/2011: benign     Fibroadenoma of breast determined by biopsy 2015    Right     Placenta  accreta             Past Surgical History     Past Surgical History:   Procedure Laterality Date      SECTION        SECTION  05/15/2018      SECTION  10/18/2019     COLPOSCOPY  10/24/2011    Benign cervical epithelium     SC BREAST AUGMENTATION  2016     TONSILLECTOMY       US BREAST BIOPSY RT Right 2015    Fibroadenoma            Medications     Prior to Admission Medications   Prescriptions Last Dose Informant Patient Reported? Taking?   Prenatal Vit-Fe Fumarate-FA (PRENATAL VITAMIN PO)   Yes No   Sig: Take 1 tablet by mouth daily      Facility-Administered Medications: None       No Known Allergies         Social History     Social History     Tobacco Use     Smoking status: Never Smoker     Smokeless tobacco: Never Used             Review of Systems   Pertinent positives and negatives as described above in HPI           Physical Exam   There were no vitals filed for this visit.  Gen: Pleasant, gravid woman.  Sitting up in bed, in NAD.  Heart: Well perfused    Lungs: Non labored breathing on room air   Abdomen: Non-tender, Gravid  Back: no lesions, no CVA tenderness  Extremities: trace edema  : pad is about 30-40% full of bright red blood; no active bleeding     Cervix: deferred  Membranes: intact        Contraction frequency: no toco present   FHR:  Rate 140 dop tones       Imaging:   - Complete placenta previa noted on comprehensive US performed 22 at 22w1d. Follow-up transvaginal ultrasound on 22 with previa and normal cervix length.  - US on  also showed multiple vascular lacunae within the placenta with loss of the hypoechoic zone between the placenta and myometrium. There was also increased subplacental vascularity. 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   -Comprehensive US . Ongoing findings concerning for PAS with multiple large sonolucent spaces with increased bladder edge vascularity.         Assessment and Plan   Bhumi Lorenzo is a 32 year old   at 34w6d by 7w6d US who presents today y eva helicopter from [facility] with bleeding in the setting of placenta previa and history of CS x3.    History of CS x3  Posterior placenta previa withPlacenta accreta spectrum disorder   Planned  hysterectomy with bilateral salpingectomy and possible cystoscopy   .-Recommended  section with hysterectomy due to very high risk for PAS; likely higher than 61%; CS x3 prior. Imaging is very concerning for this; thus we discussed the plan is to perform hysterectomy; she is understanding of this plan and was well counseled antenatally and she is done with childbearing.   Discussed with the patient the higher relative risk of including maternofetal compromise, need for transfusion, DIC, bladder and bowel injury.   -Operative report reviewed from most recent CS 10/18/2019 - no adhesions noted, closed peritoneum. uncomplicated surgery.   -Consent obtained: The risks, benefits, and alternatives of  section with hysterectomy and bilateral salpingectomy, possible cystoscopy were discussed, including the risks of bleeding, infection, injury to surrounding organs, fetal injury. She consented to a blood transfusion in the event of a life threatening amount of bleeding. Discussed that we anticipate a high volume blood loss and we have type and crossed her. She had time to ask questions and agreed to proceed. Surgical consent was signed. Blood transfusion consent signed.   -Type and cross x4u; blood bank aware   -Pain: GETA   -Anesthesia aware  -Covid positive 22  -Hgb 13.1 ; plt 229 - last in feb. Will obtain stat CBC now   -2 PIV to be placd   -NKDA   -Ancef prior to skin incision   -PPX: SCDs prior to surgery  -[place]  -Proceed to OR in Urgent fashion     Mitral valve regurgitation  Palpitations  - Cardiology visit  with recommendation for echo prior to her upcoming delivery.   - Echo : not yet performed per pt   - Asymptomatic this evening    - EKG on 2/16/22:      Vent. Rate : 098 BPM         P-R Int : 150 ms                QRS Dur : 086 ms      QT Int : 338 ms                  P-R-T Axes : 077 042 016 degrees      QTc Int : 394 ms       Sinus rhythm, normal EKG  When compared with ECG of 11/10/14- no significant change other than rate   - Will not complete Zio patch prior to delivery due to time constraints      Routine PNC:  - Prenatal labs:               Rh: +  antibody: neg               HepB/HIV/RPR: nonreactive               GC/CT: neg               Rubella: immune                 GCT: pass               UC: no growth  - Immunizations:  s/p TDap  - Genetic screening: declines  - GBS neg     Fetal well being  - Appropriate Dop tones - proceeding to OR in urgent fashion   - Betamethasone series (4/21-4/22).  - NICU consulted and will be present for delivery     Patient seen and care plan discussed with Dr. Gutiérrez.     Sydni Lugo MD   OBGYN PGY3  04/26/22  2:22 AM     Appreciate note by Dr. Lugo. Patient has been seen and examined by me with from the resident, agree with above note.     Loreto Gutiérrez MD

## 2022-04-27 LAB — HGB BLD-MCNC: 9.4 G/DL (ref 11.7–15.7)

## 2022-04-27 PROCEDURE — 250N000013 HC RX MED GY IP 250 OP 250 PS 637: Performed by: STUDENT IN AN ORGANIZED HEALTH CARE EDUCATION/TRAINING PROGRAM

## 2022-04-27 PROCEDURE — 120N000002 HC R&B MED SURG/OB UMMC

## 2022-04-27 PROCEDURE — 36415 COLL VENOUS BLD VENIPUNCTURE: CPT | Performed by: STUDENT IN AN ORGANIZED HEALTH CARE EDUCATION/TRAINING PROGRAM

## 2022-04-27 PROCEDURE — 250N000011 HC RX IP 250 OP 636: Performed by: STUDENT IN AN ORGANIZED HEALTH CARE EDUCATION/TRAINING PROGRAM

## 2022-04-27 PROCEDURE — 85018 HEMOGLOBIN: CPT | Performed by: STUDENT IN AN ORGANIZED HEALTH CARE EDUCATION/TRAINING PROGRAM

## 2022-04-27 RX ORDER — IBUPROFEN 600 MG/1
600 TABLET, FILM COATED ORAL EVERY 6 HOURS
Status: DISCONTINUED | OUTPATIENT
Start: 2022-04-27 | End: 2022-05-01 | Stop reason: HOSPADM

## 2022-04-27 RX ORDER — AMOXICILLIN 250 MG
1 CAPSULE ORAL DAILY
Qty: 100 TABLET | Refills: 0 | Status: SHIPPED | OUTPATIENT
Start: 2022-04-27

## 2022-04-27 RX ORDER — ACETAMINOPHEN 325 MG/1
650 TABLET ORAL EVERY 6 HOURS PRN
Qty: 100 TABLET | Refills: 0 | Status: SHIPPED | OUTPATIENT
Start: 2022-04-27

## 2022-04-27 RX ORDER — IBUPROFEN 600 MG/1
600 TABLET, FILM COATED ORAL EVERY 6 HOURS PRN
Qty: 60 TABLET | Refills: 0 | Status: SHIPPED | OUTPATIENT
Start: 2022-04-27

## 2022-04-27 RX ADMIN — KETOROLAC TROMETHAMINE 30 MG: 30 INJECTION, SOLUTION INTRAMUSCULAR; INTRAVENOUS at 04:20

## 2022-04-27 RX ADMIN — OXYCODONE HYDROCHLORIDE 5 MG: 5 TABLET ORAL at 06:38

## 2022-04-27 RX ADMIN — LIDOCAINE PATCH 4% 2 PATCH: 40 PATCH TOPICAL at 08:23

## 2022-04-27 RX ADMIN — ENOXAPARIN SODIUM 40 MG: 40 INJECTION SUBCUTANEOUS at 06:38

## 2022-04-27 RX ADMIN — ACETAMINOPHEN 975 MG: 325 TABLET ORAL at 16:33

## 2022-04-27 RX ADMIN — OXYCODONE HYDROCHLORIDE 5 MG: 5 TABLET ORAL at 14:30

## 2022-04-27 RX ADMIN — KETOROLAC TROMETHAMINE 30 MG: 30 INJECTION, SOLUTION INTRAMUSCULAR; INTRAVENOUS at 10:28

## 2022-04-27 RX ADMIN — IBUPROFEN 600 MG: 600 TABLET ORAL at 22:36

## 2022-04-27 RX ADMIN — SENNOSIDES AND DOCUSATE SODIUM 2 TABLET: 8.6; 5 TABLET ORAL at 08:20

## 2022-04-27 RX ADMIN — OXYCODONE HYDROCHLORIDE 5 MG: 5 TABLET ORAL at 18:36

## 2022-04-27 RX ADMIN — POLYETHYLENE GLYCOL 3350 17 G: 17 POWDER, FOR SOLUTION ORAL at 08:24

## 2022-04-27 RX ADMIN — OXYCODONE HYDROCHLORIDE 5 MG: 5 TABLET ORAL at 22:36

## 2022-04-27 RX ADMIN — OXYCODONE HYDROCHLORIDE 5 MG: 5 TABLET ORAL at 02:11

## 2022-04-27 RX ADMIN — ACETAMINOPHEN 975 MG: 325 TABLET ORAL at 04:19

## 2022-04-27 RX ADMIN — ACETAMINOPHEN 975 MG: 325 TABLET ORAL at 22:35

## 2022-04-27 RX ADMIN — IBUPROFEN 600 MG: 600 TABLET ORAL at 16:33

## 2022-04-27 RX ADMIN — ACETAMINOPHEN 975 MG: 325 TABLET ORAL at 10:28

## 2022-04-27 RX ADMIN — SENNOSIDES AND DOCUSATE SODIUM 2 TABLET: 8.6; 5 TABLET ORAL at 20:33

## 2022-04-27 RX ADMIN — OXYCODONE HYDROCHLORIDE 5 MG: 5 TABLET ORAL at 10:28

## 2022-04-27 NOTE — PROGRESS NOTES
[facility] Obstetrics   Postpartum Progress Note    Name: Bhumi Johnson  : 1990  MRN: [id number]    S: Patient is resting comfortably. Pain is improving and well controlled, much better than yesterday but has not gotten out of bed much. She did walk to the sink yesterday. Not yet passing flatus and nguyen is still in place; will remain there for two weeks. she thinks it is less red now.  Ambulating without dizziness.  Had some vomiting yesterday with jello but tolerated oral fluidss overnight. Denies fevers/chills, headache, CP, SOB. She is breast feeding. Overall she Is grateful though she and  wanted more children. Bhumi is one of nine kids. She has a lot of good questions and wants to see the picture of her uterus.     O:  Patient Vitals for the past 24 hrs:   BP Temp Temp src Pulse Resp SpO2   22 0007 102/61 98.9  F (37.2  C) Oral -- 18 99 %   22 1955 111/58 98.2  F (36.8  C) Oral -- 18 99 %   22 1805 116/73 -- -- -- 18 99 %   22 1635 124/76 98  F (36.7  C) Oral -- -- 98 %   22 1535 135/72 -- -- -- 16 98 %   22 1430 123/79 97.7  F (36.5  C) Oral 70 16 --   22 1400 120/72 -- -- 69 14 --   22 1330 128/80 -- -- 76 14 --   22 1300 (!) 148/70 -- -- 77 14 99 %   22 1230 113/82 97.7  F (36.5  C) Oral 76 -- --   22 1200 121/76 -- -- 78 14 99 %   22 1130 117/71 -- -- -- -- --   22 1100 129/77 97.5  F (36.4  C) Oral 77 14 99 %   22 1030 126/81 -- -- 75 16 99 %   22 1000 121/79 97.5  F (36.4  C) Axillary 75 14 98 %   22 0930 120/77 -- -- 76 14 98 %   22 0815 105/77 98  F (36.7  C) Axillary 101 13 97 %   22 0800 107/77 -- -- 103 12 96 %   22 0745 105/74 -- -- 103 (!) 43 96 %   22 0730 109/78 -- -- 113 17 100 %   22 0725 -- 97.7  F (36.5  C) Axillary -- 16 --   22 0220 113/79 -- -- -- 14 100 %       Gen: NAD. Alert, oriented. Resting comfortably in bed.  CV: well perfused   Resp:   no increased work of breathing  Abd: Soft, appropriately tender, non-distended  Incision: c/d/i, no erythema or induration, no active drainage   : nguyen in place with pink-tinged urine, clearing in the tubing. About 600ml in the bag    Ext: Trace lower extremity edema bilaterally     UOP:   2445ml // 1125ml since midnight     Labs:   HGB  Recent Labs   Lab 22  1810 22  1117 22  0450   HGB 9.8* 11.9 10.0*  10.4*       A/P: Bhumi Johnson is a 32 year old  female, now POD#1 s/p RCCS, hysterectomy and bilateral salpingectomy and cystoscopy. 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 Stable in the postoperative period.      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, s/p repair with reassuring intraop cystoscopy   - UOP has been excellent and is clearing in the tube   - Cystogram prior to removal 3D macrobid 100 BID after removal     Mitral valve regurgitation  Palpitations  - Currently asymptomatic  - On telemetry - no events   - vitally stable and within normal limits   - Echo : not yet performed per pt; scheduled for May d/t covid  - Lovenox ppx (ord'd to start )    Covid in pregnancy   - Congestion is predominant symptom   - Positive on   - will start lovenox here today and consider discharge on AC    PPH  - Vitally stable, no tachycardia   - Hgb downtrended appropriately and plt stable   - Hgb 12.3 > 5000mL > (methergine, TXA) 5u pRBC, 2u FFP>11.2>10>11.9 > 9.8  - Plts 157>121>>145    Postoperative care  - Continue routine postoperative management  - Rh pos, Rubella immune  - Pain: Continue ibuprofen/Tylenol scheduled.  Oxycodone PRN for breakthrough.   - Heme: as above   - FEN/GI: regular diet, stool softeners PRN  - : Nguyen in for 2 weeks - as above   - Contraception: s/p hys   - Breastfeeding  - Baby doing well    Dispo: anticipate discharge to home on POD#3-4.    JEANINE German MD  OB-GYN Resident,  PGY-3    04/27/2022 1:46 AM    I have seen and examined the patient without the resident. I have reviewed, edited, and agree with the note.   My findings are: VSS and RRR  Appears well  Incision bandage CDI  LE wnl  Hemoglobin   Date Value Ref Range Status   04/26/2022 9.8 (L) 11.7 - 15.7 g/dL Final   04/26/2022 11.9 11.7 - 15.7 g/dL Final     Acute blood loss anemia- expected, stable, and asymptomatic.   Priyanka Flores MD

## 2022-04-27 NOTE — PLAN OF CARE
Patient VSS, scant amt of bleeding noted.  Adams draining dark pinkish urine.   Patient needs assistance with self cares and is ambulating with assistance x1.   Incision is CDI.  Abdominal binder on.  Patient is taking Tylenol, Toradol and Oxycodone for pain and states the medication is controlling her pain adequately.  Patient is tolerating PO liquids, jello and catrachito crackers.  Plan of care reviewed with patient and spouse, understanding verbalized.  Continue with postpartum cares and assemssments.

## 2022-04-27 NOTE — PROGRESS NOTES
Post  Anesthesia Follow Up Note    Patient: Bhumi Johnson    Patient location: Postpartum floor.    Chief complaint: Acute postoperative pain management    Procedure(s) Performed:  Procedure(s):   section, Hysterectomy, Bilateral Salphingectomy, Repair of Cystostomy and Cystoscopy  Hysterectomy, Bilateral Salpingectomy, Repair of Cystostomy and Cystoscopy  Cystoscopy    Anesthesia type: General     Subjective:   Patient is doing well this morning. Reports satisfaction with pain management. Stated she did experience some nausea post procedure but none currently. She is looking forward to eating some real food today.    Pain Control: 1/10 at rest and 2/10 with ambulation    Additional ROS:  She denies weakness, denies paresthesia, denies difficulties breathing or voiding, denies nausea or vomiting. She is able to ambulate and tolerating crackers    Objective:    Respiratory Function (RR / SpO2 / Airway Patency): Satisfactory    Cardiac Function (HR / Rhythm / BP): Satisfactory    Strength and sensation lower extremities: Normal    Site of spinal/epidural insertion: No signs of infection or inflammation.     Last Vitals: /59 (Cuff Size: Adult Regular)   Pulse 70   Temp 36.7  C (98.1  F) (Oral)   Resp 18   SpO2 99%   Breastfeeding Unknown     Assessment and plan:   Bhumi Johnson is a 32 year old female  POD #1 s/p  section, hysterectomy w/ BSO, and cystotomy repair due to percreta. Received pRBCs 1750 ml and  ml intraop.  Pt is ambulating with difficulty, no weakness or paresthesias.  There is evidence of adverse side effects associated with GETA and nerve block injections.  The patient is receiving adequate incisional pain control at this time and anticipate up to 72 hours of incisional pain control.  However, we further anticipate that the patient will require opioid/nonopioid analgesics for visceral and muscle pain that is not controlled with local  anesthetic.      In brief summary, her post-operative analgesia is adequate today. Further interventional analgesic strategies would be of little utility at this time. Thus, we recommend proceeding with PO analgesics including staggered dosing of NSAIDs (ibuprofen) and acetaminophen, with a taper of oxycodone.     Thank you for including us in the care for this patient.    Cassy Bella MD  Anesthesia Resident, CA-3  Cassy Bella MD on 4/27/2022 at 6:46 AM

## 2022-04-27 NOTE — PLAN OF CARE
"Patient vitals stable and afebrile.  Pt using PRN pain meds and now states pain is better and she is \"finally comfortable\".  Per Katty RN lactation recommended to pump & dump while oxy usage is 10mg @ 4hr .  The patient's nguyen catheter is patent and flushed. Output adequate. IV now SL. Urine is a dark red color and will continue to monitor.  Patient is pumping Q3Hrs. Pt able to watch  on ipad.  Will continue plan of care.   "

## 2022-04-27 NOTE — PLAN OF CARE
Patient arrived to [facility] unit via wheelchair at 1415,with belongings. Received report from NURIA Blanchard and checked bands. Unit and room orientation completd. Call light given; no concerns present at this time. Continue with plan of care.

## 2022-04-27 NOTE — PROGRESS NOTES
Data: Bhumi Johnson transferred to 71 via wheelchair at 1415. Baby in NICU.  Action: Receiving unit notified of transfer: Yes. Patient and family notified of room change. Report given to NURIA Rosas at 1400. Belongings sent to receiving unit. Accompanied by Registered Nurse. Oriented patient to surroundings. Call light within reach.   Response: Patient tolerated transfer and is stable.

## 2022-04-28 PROCEDURE — 250N000013 HC RX MED GY IP 250 OP 250 PS 637: Performed by: STUDENT IN AN ORGANIZED HEALTH CARE EDUCATION/TRAINING PROGRAM

## 2022-04-28 PROCEDURE — 120N000002 HC R&B MED SURG/OB UMMC

## 2022-04-28 PROCEDURE — 250N000011 HC RX IP 250 OP 636: Performed by: STUDENT IN AN ORGANIZED HEALTH CARE EDUCATION/TRAINING PROGRAM

## 2022-04-28 RX ORDER — POLYETHYLENE GLYCOL 3350 17 G/17G
1 POWDER, FOR SOLUTION ORAL DAILY
Qty: 507 G | Refills: 1 | Status: SHIPPED | OUTPATIENT
Start: 2022-04-28

## 2022-04-28 RX ADMIN — SIMETHICONE 80 MG: 80 TABLET, CHEWABLE ORAL at 14:40

## 2022-04-28 RX ADMIN — ACETAMINOPHEN 975 MG: 325 TABLET ORAL at 04:11

## 2022-04-28 RX ADMIN — IBUPROFEN 600 MG: 600 TABLET ORAL at 23:42

## 2022-04-28 RX ADMIN — IBUPROFEN 600 MG: 600 TABLET ORAL at 04:11

## 2022-04-28 RX ADMIN — ACETAMINOPHEN 975 MG: 325 TABLET ORAL at 10:20

## 2022-04-28 RX ADMIN — SENNOSIDES AND DOCUSATE SODIUM 2 TABLET: 8.6; 5 TABLET ORAL at 20:54

## 2022-04-28 RX ADMIN — OXYCODONE HYDROCHLORIDE 5 MG: 5 TABLET ORAL at 06:15

## 2022-04-28 RX ADMIN — ENOXAPARIN SODIUM 40 MG: 40 INJECTION SUBCUTANEOUS at 06:15

## 2022-04-28 RX ADMIN — SIMETHICONE 80 MG: 80 TABLET, CHEWABLE ORAL at 08:40

## 2022-04-28 RX ADMIN — OXYCODONE HYDROCHLORIDE 5 MG: 5 TABLET ORAL at 02:22

## 2022-04-28 RX ADMIN — OXYCODONE HYDROCHLORIDE 5 MG: 5 TABLET ORAL at 10:21

## 2022-04-28 RX ADMIN — ACETAMINOPHEN 975 MG: 325 TABLET ORAL at 23:42

## 2022-04-28 RX ADMIN — OXYCODONE HYDROCHLORIDE 5 MG: 5 TABLET ORAL at 23:42

## 2022-04-28 RX ADMIN — POLYETHYLENE GLYCOL 3350 17 G: 17 POWDER, FOR SOLUTION ORAL at 08:40

## 2022-04-28 RX ADMIN — SIMETHICONE 80 MG: 80 TABLET, CHEWABLE ORAL at 02:22

## 2022-04-28 RX ADMIN — OXYCODONE HYDROCHLORIDE 5 MG: 5 TABLET ORAL at 14:40

## 2022-04-28 RX ADMIN — IBUPROFEN 600 MG: 600 TABLET ORAL at 16:45

## 2022-04-28 RX ADMIN — IBUPROFEN 600 MG: 600 TABLET ORAL at 10:20

## 2022-04-28 RX ADMIN — ACETAMINOPHEN 975 MG: 325 TABLET ORAL at 16:45

## 2022-04-28 RX ADMIN — OXYCODONE HYDROCHLORIDE 5 MG: 5 TABLET ORAL at 18:59

## 2022-04-28 RX ADMIN — SENNOSIDES AND DOCUSATE SODIUM 2 TABLET: 8.6; 5 TABLET ORAL at 08:40

## 2022-04-28 NOTE — PLAN OF CARE
Problem: Plan of Care - These are the overarching goals to be used throughout the patient stay.    Goal: Patient-Specific Goal (Individualized)  Flowsheets (Taken 4/28/2022 0116)  Individualized Care Needs: pain control  Anxieties, Fears or Concerns: concern going home with indwelling catheter  Patient-Specific Goals (Include Timeframe): see baby asap   Goal Outcome Evaluation:  Data: Vital signs within normal limits. Postpartum checks within normal limits - see flow record. Patient eating and drinking normally. Catheter remains patent, flushes well and is draining moderate amounts of blood tinged urine. Pt is able to ambulate. No apparent signs of infection. Incision healing well midline, AURY with dermabond, abd layed over to prevent chafing of abd binder. Patient performing self cares, notes pain control satisfactory. Pt belching but unable to pass flatus, abd distended and tympanic to percussion. Pt pumping independently with good volumes. Pt able to use ipad to see baby in NICU. Denies cough, SOB or any COVID complaints.  Action: Patient medicated during the shift for pain and cramping. See MAR. Patient reassessed within 1 hour after each medication and pain was improved - patient stated she was comfortable. Patient education done about indwelling catheter cares, postpartum expectation and goals. See flow record.  Response: Pt would like to see baby as soon as possible. Support persons  present.   Plan: Anticipate discharge on 4/29.

## 2022-04-28 NOTE — PROVIDER NOTIFICATION
04/27/22 2200   Provider Notification   Provider Name/Title ERIC Brandon   Method of Notification Electronic Page   Request Evaluate-Remote   Notification Reason Other   Spoke to G2 to get clarification on nguyen catheter flush. She stated to use normal saline and flush about 30-50ml.

## 2022-04-28 NOTE — PLAN OF CARE
Goal Outcome Evaluation: VSS. Postpartum checks WDL. Up independently. Incision AURY with liquid bandage- no s/s of infection noted. Plans to shower this evening. Nguyen in place with dark meng colored urine. Flushed nguyen per orders. Pain managed with tylenol, ibuprofen, and oxycodone. Pumping for baby in the NICU.

## 2022-04-28 NOTE — PLAN OF CARE
Goal Outcome Evaluation:  3015-1193:  VSS and postpartum assessments WDL for post -hysterectomy.  Pt has indwelling nguyen catheter in place that is collecting adequate amounts of meng colored urine with occasional clots.  Pt had one episode of feeling very uncomfortable and the urge to void, this writer assessed catheter which did not seem to be flowing/patent.  Catheter flushed/irriagated which removed clots and drained 450mls, Dr Gutiérrez in room during this and updated catheter flush orders to be done every 2-4 hours PRN.  Nguyen catheter cares done with catheter wipes.  Up ad vamshi with steady gait and independent with cares.  Pumping independently for infant in NICU, watching infant on iPad and looking forward to going to visit infant at midnight when she will be cleared from COVID isolation.  Pain managed with tylenol, ibuprofen, oxycodone and simethicone per MAR.  Incisional liquid bandage intact.  Pt passed gas this morning!  No spouse/family present this shift, this writer providing support.  Will continue with postpartum cares and education per plan of care.     Plan of Care Reviewed With: patient     Overall Patient Progress: improving

## 2022-04-28 NOTE — CONSULTS
"[facility]  MATERNAL CHILD HEALTH   INITIAL NICU PSYCHOSOCIAL ASSESSMENT      DATA:      Presenting Information: Mom, Bhumi, delivered a , appropriate for gestational age, 34w6d, male infant via  due to previa. Baby boy currently does not have a name at this time. He was admitted to the NICU for monitoring and management of prematurity, RDS and possible sepsis. SW was consulted to meet with this family per NICU admission of infant and mothers ongoing hospitalization.      Living Situation: Parents, Bhumi and Gato, are  and live together in [place] along with their 3 children Mark (14), Jojo (4), and Fabiana (2 1/2). Bhumi reports Jojo and Fabiana are currently with her parents.      Social Support: Bhumi shares the family has \"plenty\" of social support at this time. She reports she is close to her 3 sisters in particular and shares they all live within 25 minutes of one another. Bhumi reports both her and Tolu parents are also supportive.      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 She will have 12 weeks of time off, partially paid. Bhumi reports Iain is self employed and has flexibility because of this.      Insurance: [employer], which baby will be added to.      Follow up Clinic: [facility] in [place].      Source of Financial Support: parental employment     Mental Health History: Mother denies a significant mental health history.     Diagnoses: None reported.     Medications: No medications at this time.     Therapy: Not currently in therapy.     History of Postpartum Mood Disorders: Bhumi denies any difficulties with mood or coping following her previous pregnancies.     Chemical Health History: Denies a chemical health history.     Legal/Child Protection Involvement: Denies legal or child protection involvement.      INTERVENTION: " "       Chart review    Conducted Psychosocial Assessment    Introduction to Maternal Child Health SW role and scope of practice    Orientation to the NICU (parking, lodging, meals, visitation)    Validated emotions and provided supportive listening    SW described process for birth certificate, social security card and insurance process.     Provided psychoeducation on  mood disorders and indicated that SW would continue to monitor mood and support bridging to mental health resources as needed.    Provided information on how to connect with this sw going forward.      ASSESSMENT:      Coping: Sw spoke with Bhumi via phone due to her COVID isolation status. Bhumi endorses \"feeling a lot better today\" and reports she was fatigued most of yesterday.  Iain was not present during the assessment but Bhumi spoke highly of him throughout. Bhumi reports she has been watching her baby through the care space monitor as she is unable to visit him until COVID precautions end on Friday. She shares plans to name him at this time and did not present as outwardly distressed by this separation. She has had a previous NICU experience and shares her oldest child spent over a week here.      Bhumi reports the family lives just over an hour away. Sw did not discuss lodging options as she is able to stay with her baby while on the 11th floor. She reports plans to breastfeed. Sw did not note transportation, housing, social/emotional, or resources barriers at this time. Bhumi had no other questions or concerns for this sw.      Assessment of parental risk for PMAD: Low to mild risk, as Bhumi did not report a history of PMAD following her previous pregnancies and has also experienced a NICU admission with her first child.      Risk Factors: distance from home (66 miles), other children at home needing care and attention  and hospitalization during a global pandemic     Resiliency Factors & Strengths: experienced parents, " strong social support, parental employment, stable housing, reliable transportation, able and willing to ask advocate for self/baby, demonstrated commitment to being present and engaged in baby's cares and demonstrated ability to integrate new information      PLAN:      SW will continue to follow for supportive intervention.     ANNETTE Hsu  Maternal Child Health   Phone: [phone]  Pager: [phone]  After hours pager: [phone]

## 2022-04-28 NOTE — PROVIDER NOTIFICATION
04/28/22 0913   Provider Notification   Provider Name/Title Dr Hodge   Method of Notification Electronic Page   Request Evaluate-Remote   Notification Reason Other   Pt is 10 days from +covid today.  She is wondering what time tomorrow she will come off of isolation and can go see her infant?  Midnight tonight?  Also, can I remove her PIV or do you want that in for any reason still?  Thanks!

## 2022-04-28 NOTE — PROGRESS NOTES
[facility] Obstetrics   Postpartum Progress Note    Name: Bhumi Johnson  : 1990  MRN: [id number]    S: Patient is resting comfortably. Pain is improving and well controlled. Walked more yesterday and now passing flatus. nguyen is still in place; will remain there for two weeks. she thinks it is less red now.  Ambulating without dizziness.  No nausea or emesis overnight. Denies fevers/chills, headache, CP, SOB. She is breast feeding. Overall she Is feeling really well. Hard not to see baby but doing ok.    O:  Patient Vitals for the past 24 hrs:   BP Temp Temp src Pulse Resp SpO2   22 0830 113/87 97.7  F (36.5  C) Oral 101 16 --   22 0010 114/69 98  F (36.7  C) Oral 83 16 --   22 2234 118/72 98.2  F (36.8  C) Oral 92 16 --   22 1437 112/77 98  F (36.7  C) Oral 93 16 99 %       Gen: NAD. Alert, oriented. Resting comfortably in bed.  CV: well perfused   Resp:  no increased work of breathing  Abd: Soft, appropriately tender, non-distended   Incision: c/d/i, no erythema or induration, no active drainage   : nguyen in place with pink-tinged urine, clearing in the tubing. About 500ml in the bag     Ext: Trace lower extremity edema bilaterally     UOP:   2600ml // 650 ml since midnight with an additiona 500mL in the bag currently. Clear to red tinged.     Labs:   HGB  Recent Labs   Lab 22  1056 22  1810 22  1117 22  0450   HGB 9.4* 9.8* 11.9 10.0*  10.4*       A/P: Bhumi Johnson is a 32 year old  female, now POD#2 s/p RCCS, hysterectomy and bilateral salpingectomy and cystoscopy. 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 Stable in the postoperative period.      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, s/p repair with reassuring intraop cystoscopy   - UOP has been excellent and is clearing in the tube   - Cystogram prior to removal plan 3D macrobid 100 BID after removal     Mitral valve  regurgitation  Palpitations  - Currently asymptomatic  - On telemetry - no events   - vitally stable and within normal limits   - Echo 4/21: not yet performed per pt; scheduled for May d/t covid  - Lovenox ppx (ord'd to start 4/27)    Covid in pregnancy   - Congestion is predominant symptom - no breathing difficulties   - Positive on 4/18  - on lovenox, will discharge with ASA    PPH  - Vitally stable, no tachycardia   - Hgb downtrended appropriately and plt stable   - Hgb 12.3 > 5000mL > (methergine, TXA) 5u pRBC, 2u FFP>11.2>10>11.9 > 9.8  - Plts 157>121>>145    Postoperative care  - Continue routine postoperative management  - Rh pos, Rubella immune  - Pain: Continue ibuprofen/Tylenol scheduled.  Oxycodone PRN for breakthrough.   - Heme: as above   - FEN/GI: regular diet, stool softeners PRN  - : Nguyen in for 2 weeks - as above   - Contraception: s/p hys   - Breastfeeding  - Baby doing well    Dispo: anticipate discharge to home on POD#3-4.    JEANINE German MD  OB-GYN Resident, PGY-3  04/28/2022 7:52 AM    Appreciate note by Dr. Lugo. Patient has been seen and examined by me separate from the resident, agree with above note. Patient with symptoms of bladder fullness today, nguyen flushed and noted to be clogged. Small clots removed with flushing and nguyen drained ~500ml. Now draining well. Orders changed to increase frequency of flushing or if patient has symptoms to flush and bladder scan if not draining. Clarified that antibiotics are not needed at this time and will  be given following removal after cystogram is performed.     Loreto Gutiérrez MD  2:33 PM

## 2022-04-28 NOTE — PROGRESS NOTES
Patient tested positive for COVID with symptoms on 4/18/22. She is not immunocompromised. Special Precautions can be discontinued on 4/29/22. Providers can add recovered flag or contact Infection Prevention during business hours to update flag.

## 2022-04-28 NOTE — PROVIDER NOTIFICATION
04/28/22 1325   Provider Notification   Provider Name/Title Dr. ERMIAS Gutiérrez   Method of Notification Electronic Page   Request Evaluate-Remote   Notification Reason Other     6273 can you touch base with Urology (ROBBIE Gallardo [phone]) re bladder irrigation? Also consider prophylactic abx?

## 2022-04-28 NOTE — PLAN OF CARE
"Data: Vital signs within normal limits. Postpartum checks within normal limits - see flow record. Patient eating and drinking normally. Patient is up ambulating in room independently, nguyen catheter patent. No apparent signs of infection. Incision healing well. Patient performing self cares and is pumping for infant in NICU.  Action: Patient medicated during the shift for pain. See MAR. Patient reassessed within 1 hour after each medication and pain was improved - patient stated she was comfortable. Patient education done about self cares and pain management. See flow record. Nguyen catheter flushed with 30 ml of sterile normal saline.  Response: Positive attachment behaviors observed with infant. Support persons  present.   Plan: Anticipate discharge on 4/29.  Problem: Plan of Care - These are the overarching goals to be used throughout the patient stay.    Goal: Plan of Care Review/Shift Note  Description: The Plan of Care Review/Shift note should be completed every shift.  The Outcome Evaluation is a brief statement about your assessment that the patient is improving, declining, or no change.  This information will be displayed automatically on your shift note.  Outcome: Ongoing, Progressing  Flowsheets (Taken 4/27/2022 2349)  Plan of Care Reviewed With: patient  Overall Patient Progress: improving     Problem: Plan of Care - These are the overarching goals to be used throughout the patient stay.    Goal: Patient-Specific Goal (Individualized)  Description: You can add care plan individualizations to a care plan. Examples of Individualization might be:  \"Parent requests to be called daily at 9am for status\", \"I have a hard time hearing out of my right ear\", or \"Do not touch me to wake me up as it startles me\".  Outcome: Ongoing, Progressing  Flowsheets (Taken 4/27/2022 2349)  Individualized Care Needs: rest  Anxieties, Fears or Concerns: none  Patient-Specific Goals (Include Timeframe): pumping and rest   " Goal Outcome Evaluation:    Plan of Care Reviewed With: patient     Overall Patient Progress: improving

## 2022-04-29 LAB — PLATELET # BLD AUTO: 271 10E3/UL (ref 150–450)

## 2022-04-29 PROCEDURE — 85049 AUTOMATED PLATELET COUNT: CPT | Performed by: STUDENT IN AN ORGANIZED HEALTH CARE EDUCATION/TRAINING PROGRAM

## 2022-04-29 PROCEDURE — 250N000013 HC RX MED GY IP 250 OP 250 PS 637: Performed by: STUDENT IN AN ORGANIZED HEALTH CARE EDUCATION/TRAINING PROGRAM

## 2022-04-29 PROCEDURE — 36415 COLL VENOUS BLD VENIPUNCTURE: CPT | Performed by: STUDENT IN AN ORGANIZED HEALTH CARE EDUCATION/TRAINING PROGRAM

## 2022-04-29 PROCEDURE — 250N000011 HC RX IP 250 OP 636: Performed by: STUDENT IN AN ORGANIZED HEALTH CARE EDUCATION/TRAINING PROGRAM

## 2022-04-29 PROCEDURE — 120N000002 HC R&B MED SURG/OB UMMC

## 2022-04-29 RX ORDER — FUROSEMIDE 20 MG
20 TABLET ORAL ONCE
Status: COMPLETED | OUTPATIENT
Start: 2022-04-29 | End: 2022-04-29

## 2022-04-29 RX ORDER — OXYCODONE HYDROCHLORIDE 5 MG/1
5 TABLET ORAL EVERY 6 HOURS PRN
Qty: 18 TABLET | Refills: 0 | Status: SHIPPED | OUTPATIENT
Start: 2022-04-29 | End: 2022-04-29

## 2022-04-29 RX ORDER — NITROFURANTOIN 25; 75 MG/1; MG/1
100 CAPSULE ORAL 2 TIMES DAILY
Qty: 6 CAPSULE | Refills: 0 | Status: SHIPPED | OUTPATIENT
Start: 2022-05-12 | End: 2022-05-15

## 2022-04-29 RX ORDER — ACETAMINOPHEN 325 MG/1
650 TABLET ORAL EVERY 4 HOURS
Status: DISCONTINUED | OUTPATIENT
Start: 2022-04-29 | End: 2022-05-01 | Stop reason: HOSPADM

## 2022-04-29 RX ADMIN — SIMETHICONE 80 MG: 80 TABLET, CHEWABLE ORAL at 00:03

## 2022-04-29 RX ADMIN — ACETAMINOPHEN 650 MG: 325 TABLET ORAL at 12:59

## 2022-04-29 RX ADMIN — ENOXAPARIN SODIUM 40 MG: 40 INJECTION SUBCUTANEOUS at 07:02

## 2022-04-29 RX ADMIN — ACETAMINOPHEN 650 MG: 325 TABLET ORAL at 16:58

## 2022-04-29 RX ADMIN — BISACODYL 10 MG: 10 SUPPOSITORY RECTAL at 20:45

## 2022-04-29 RX ADMIN — SIMETHICONE 80 MG: 80 TABLET, CHEWABLE ORAL at 09:11

## 2022-04-29 RX ADMIN — FUROSEMIDE 20 MG: 20 TABLET ORAL at 07:02

## 2022-04-29 RX ADMIN — POLYETHYLENE GLYCOL 3350 17 G: 17 POWDER, FOR SOLUTION ORAL at 09:11

## 2022-04-29 RX ADMIN — ACETAMINOPHEN 975 MG: 325 TABLET ORAL at 06:20

## 2022-04-29 RX ADMIN — IBUPROFEN 600 MG: 600 TABLET ORAL at 06:20

## 2022-04-29 RX ADMIN — SENNOSIDES AND DOCUSATE SODIUM 2 TABLET: 8.6; 5 TABLET ORAL at 20:24

## 2022-04-29 RX ADMIN — IBUPROFEN 600 MG: 600 TABLET ORAL at 20:24

## 2022-04-29 RX ADMIN — SENNOSIDES AND DOCUSATE SODIUM 2 TABLET: 8.6; 5 TABLET ORAL at 09:11

## 2022-04-29 RX ADMIN — IBUPROFEN 600 MG: 600 TABLET ORAL at 12:59

## 2022-04-29 RX ADMIN — ACETAMINOPHEN 650 MG: 325 TABLET ORAL at 20:56

## 2022-04-29 NOTE — PROGRESS NOTES
[facility] Obstetrics   Postpartum Progress Note    Name: Bhumi Johnson  : 1990  MRN: [id number]    S: Patient is resting comfortably. Pain is improving and well controlled. Walked more yesterday and now passing flatus. nguyen is still in place; will remain there for two weeks. she thinks it is less red now.  Ambulating without dizziness.  No nausea or emesis overnight. Denies fevers/chills, headache, CP, SOB. She is breast feeding. Overall she Is feeling really well. She was cleared at midnight to see baby, so she has been in the NICU, went last night at midnight; She passed gas yesterday but not a whole lot. Feels like legs are really puffy and would like something for it.      O:  Patient Vitals for the past 24 hrs:   BP Temp Temp src Pulse Resp   22 0008 120/71 98.1  F (36.7  C) Oral 92 16   22 1645 113/77 98.2  F (36.8  C) Oral 96 16   22 0830 113/87 97.7  F (36.5  C) Oral 101 16       Gen: NAD. Alert, oriented. Resting comfortably in bed.  CV: well perfused   Resp:  no increased work of breathing  Abd: Soft, appropriately tender, non-distended   Incision: c/d/i, no erythema or induration, no active drainage   : nguyen in place with pink-tinged urine,,lighter in the tubing. About 500mL in the bag     Ext: Trace lower extremity edema bilaterally     UOP:   2750ml // 900 ml since midnight with an additiona 500mL in the bag currently. Clear to red tinged.     Labs:   HGB  Recent Labs   Lab 22  1056 22  1810 22  1117 22  0450   HGB 9.4* 9.8* 11.9 10.0*  10.4*       A/P: Bhumi Johnson is a 32 year old  female, now POD#3 s/p RCCS, hysterectomy and bilateral salpingectomy and cystoscopy. 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 Stable in the postoperative period.      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 Small clots removed with flushing and nguyen drained ~500ml. Has been draining well since. Orders changed to increase frequency of flushing or if patient has symptoms to flush and bladder scan if not draining.     Mitral valve regurgitation  Palpitations  - Currently asymptomatic  - vitally stable and within normal limits   - Echo 4/21: not yet performed per pt; scheduled for May d/t covid  - Lovenox ppx (ord'd to start 4/27)    Covid in pregnancy   - Congestion is predominant symptom - no breathing difficulties   - Positive on 4/18  - on lovenox, will discharge with daily ASA    PPH  - Vitally stable, no tachycardia   - Hgb downtrended appropriately and plt stable   - Hgb 12.3 > 5000mL > (methergine, TXA) 5u pRBC, 2u FFP>11.2>10>11.9 > 9.8  - Plts 157>121>>145    Postoperative care  - Continue routine postoperative management  - Rh pos, Rubella immune  - Pain: Continue ibuprofen/Tylenol scheduled.  Oxycodone PRN for breakthrough.   - Heme: as above   - FEN/GI: regular diet, stool softeners PRN  - : Nguyen in for 2 weeks - as above   - Contraception: s/p hys   - Breastfeeding  - Baby doing well    Dispo: anticipate discharge to home. Possibly tomorrow     JEANINE German MD  OB-GYN Resident, PGY-3  04/29/2022 6:51 AM

## 2022-04-29 NOTE — PLAN OF CARE
Goal Outcome Evaluation:    Plan of Care Reviewed With: patient     Overall Patient Progress: improving     AFVSS. Postpartum assessments for c-hyst WDL. Patient has nguyen catheter in place, draining clear meng urine, no clots or blood noted. Nguyen catheter is flushed every 2-4 hours per orders. Incision is intact with no redness, warmth, swelling or drainage noted. Passing flatus, abdomen slightly distended. Patient has not had BM yet. Senna offered. Declines at this time. Patient states pain controlled with tylenol, ibuprofen and oxycodone.Patient is ambulating. Independent with pumping and self cares. She completed her COVID quarantine and was able to visit baby after midnight. She continues to work on discharge paperwork. Continue present cares.

## 2022-04-29 NOTE — PLAN OF CARE
Goal Outcome Evaluation:    Plan of Care Reviewed With: patient     Overall Patient Progress: improving    VSS. Incision approximated with liquid bandage, no drainage noted. Pain adequately managed with tylenol and ibuprofen. Bladder irrigated x2 during shift, no clots observed, draining well. Pumping independently. Bonding well with  in NICU. Continue with plan of care.

## 2022-04-29 NOTE — LACTATION NOTE
"This note was copied from a baby's chart.  D:  I met with Bhumi; Maycol is her 4th baby.  Her 1st was in the NICU for 10 days; he went on to nurse for 14 months.  Her 2nd nursed for 10 months until she got pregnant and lost her supply; her 3rd nursed for 18 months.  Her ideal goal is 100% latching, no pumping/ bottles \"except when he goes to [facility]'s\".  She is normally in good health, takes no medications, and had augmentation in 2016 under the muscle.  She has already started to pump, is using Maintain, getting 2+ oz per pumping q3hr and has a hands-free pumping bra at home (declines using one here).   I:  I gave her a folder of introductory materials and went over pumping guidelines.  I reviewed physiology of colostrum and milk production, pumping guidelines, and I gave her a log and encouraged her to use it.   I explained how to access the videos \"Hand Expression\" and \"Maximizing Milk Production\"; as well as other helpful books and websites.   We discussed hands-on pumping techniques and usefulness of a hands-free pumping bra.  We discussed skin to skin holding and how to reach your breastfeeding goals.  We talked about medications during breastfeeding.  She verbalized understanding via teachback.  I advised her to call her insurance company about pump coverage; they will cover a rental pump now and a purchase pump in the future.    A:  Mom has information she needs to initiate her supply.   P:  Will continue to provide lactation support.    Alyssa Leyva, RNC, IBCLC          "

## 2022-04-30 PROCEDURE — 250N000013 HC RX MED GY IP 250 OP 250 PS 637: Performed by: STUDENT IN AN ORGANIZED HEALTH CARE EDUCATION/TRAINING PROGRAM

## 2022-04-30 PROCEDURE — 120N000002 HC R&B MED SURG/OB UMMC

## 2022-04-30 PROCEDURE — 250N000011 HC RX IP 250 OP 636: Performed by: STUDENT IN AN ORGANIZED HEALTH CARE EDUCATION/TRAINING PROGRAM

## 2022-04-30 RX ORDER — OXYCODONE HYDROCHLORIDE 5 MG/1
5-10 TABLET ORAL EVERY 6 HOURS PRN
Qty: 12 TABLET | Refills: 0 | Status: SHIPPED | OUTPATIENT
Start: 2022-04-30 | End: 2022-05-23

## 2022-04-30 RX ADMIN — ACETAMINOPHEN 650 MG: 325 TABLET ORAL at 01:59

## 2022-04-30 RX ADMIN — ENOXAPARIN SODIUM 40 MG: 40 INJECTION SUBCUTANEOUS at 07:10

## 2022-04-30 RX ADMIN — ACETAMINOPHEN 650 MG: 325 TABLET ORAL at 06:43

## 2022-04-30 RX ADMIN — IBUPROFEN 600 MG: 600 TABLET ORAL at 13:56

## 2022-04-30 RX ADMIN — SIMETHICONE 80 MG: 80 TABLET, CHEWABLE ORAL at 08:27

## 2022-04-30 RX ADMIN — IBUPROFEN 600 MG: 600 TABLET ORAL at 19:36

## 2022-04-30 RX ADMIN — IBUPROFEN 600 MG: 600 TABLET ORAL at 06:43

## 2022-04-30 RX ADMIN — SENNOSIDES AND DOCUSATE SODIUM 2 TABLET: 8.6; 5 TABLET ORAL at 19:36

## 2022-04-30 RX ADMIN — IBUPROFEN 600 MG: 600 TABLET ORAL at 00:45

## 2022-04-30 RX ADMIN — ACETAMINOPHEN 650 MG: 325 TABLET ORAL at 16:55

## 2022-04-30 RX ADMIN — SIMETHICONE 80 MG: 80 TABLET, CHEWABLE ORAL at 13:56

## 2022-04-30 RX ADMIN — SENNOSIDES AND DOCUSATE SODIUM 2 TABLET: 8.6; 5 TABLET ORAL at 08:39

## 2022-04-30 RX ADMIN — POLYETHYLENE GLYCOL 3350 17 G: 17 POWDER, FOR SOLUTION ORAL at 08:41

## 2022-04-30 NOTE — PLAN OF CARE
Goal Outcome Evaluation:    Plan of Care Reviewed With: patient     Overall Patient Progress: improving    Patient taking Tylenol and Ibuprofen for pain control, c/o pain on incision site. Patient is independent with cares, ambulates in room and using double breastpump to pump milk for . Patient goes to NICU to visit . With Adams catheter, intact and draining well, flushed once during the shift, no blood clots noted. Will continue with plan of care.

## 2022-04-30 NOTE — PROGRESS NOTES
[facility] Obstetrics   Postpartum Progress Note    Name: Bhumi Johnson  : 1990  MRN: [id number]    S: Patient is resting comfortably. Pain is improving and well controlled. No vaginal bleeding. Walked more yesterday and now passing flatus. nguyen is still in place; will remain there for two weeks.  Ambulating without dizziness.  No nausea or emesis overnight. Denies fevers/chills, headache, CP, SOB. She is breast feeding. Overall she Is feeling really well. Has been visiting baby in the NICU. Would like to stay one more day before rooming in.    O:  Patient Vitals for the past 24 hrs:   BP Temp Temp src Pulse Resp   22 0048 106/53 97.6  F (36.4  C) Oral 81 16   22 1610 100/69 97.7  F (36.5  C) Oral 91 --   22 0755 114/64 98  F (36.7  C) Oral 86 14       Gen: NAD. Alert, oriented. Resting comfortably in bed.  CV: well perfused   Resp:  no increased work of breathing  Abd: Soft, appropriately tender, non-distended   Incision: c/d/i, no erythema or induration, no active drainage   : nguyen in place with clear yellow urine in the bag.   Ext: Trace lower extremity edema bilaterally     UOP:   Intake/Output Summary (Last 24 hours) at 2022 0732  Last data filed at 2022 0650  Gross per 24 hour   Intake 240 ml   Output 2810 ml   Net -2570 ml   Clear yellow    Labs:   HGB  Recent Labs   Lab 22  1056 22  1810 22  1117 22  0450   HGB 9.4* 9.8* 11.9 10.0*  10.4*       A/P: Bhumi Johnson is a 32 year old  female, now POD#4 s/p RCCS, hysterectomy and bilateral salpingectomy and cystoscopy. 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 Stable in the postoperative period.      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 Small clots removed with flushing and nguyen drained ~500ml. Has been draining well since. Orders changed to increase frequency of flushing or if patient has symptoms to flush and bladder scan if not draining.     Mitral valve regurgitation  Palpitations  - Currently asymptomatic  - vitally stable and within normal limits   - Echo 4/21: not yet performed per pt; scheduled for May d/t covid  - Lovenox ppx (ord'd to start 4/27)    Covid in pregnancy   - Congestion is predominant symptom - no breathing difficulties   - Positive on 4/18  - on lovenox, will discharge with daily ASA    PPH  - Vitally stable, no tachycardia   - Hgb downtrended appropriately and plt stable   - Hgb 12.3 > 5000mL > (methergine, TXA) 5u pRBC, 2u FFP>11.2>10>11.9 > 9.8  - Plts 157>121>>145    Postoperative care  - Continue routine postoperative management  - Rh pos, Rubella immune  - Pain: Continue ibuprofen/Tylenol scheduled.  Oxycodone PRN for breakthrough.   - Heme: as above   - FEN/GI: regular diet, stool softeners PRN  - : Nguyen in for 2 weeks - as above   - Contraception: s/p hyst  - Breastfeeding  - Baby doing well    Dispo: anticipate discharge today vs tomorrow    Ariane Colorado MD  Ob/Gyn Resident, PGY-1  04/30/2022 7:29 AM     Appreciate Dr. Colorado's note above, patient also seen and examined by me. I agree with the note above. Plan discharge to boarding status tomorrow.  Yael Dominguez MD

## 2022-04-30 NOTE — PLAN OF CARE
Problem: Pain (Postpartum  Delivery)  Goal: Acceptable Pain Control  Intervention: Prevent or Manage Pain  Recent Flowsheet Documentation  Taken 2022 1356 by Priyanka Smallwood, RN  Pain Management Interventions: medication (see MAR)   Goal Outcome Evaluation:    VSS. Pain managed with ibuprofen. Adams draining straw colored urine. Visiting  in NICU. Pumping independently.

## 2022-04-30 NOTE — PLAN OF CARE
Goal Outcome Evaluation:    Plan of Care Reviewed With: patient     Overall Patient Progress: improving     VSS and postpartum assessment WDL. She is up ad vamshi, pain managed with tylenol and ibuprofen, and wearing abdominal binder. Patient has indwelling nguyen catheter due to bladder reconstruction. Cath cares and q4hr nguyen irrigations were completed during shift. No BM and is passing flatus (taking Senna and PRN suppository). Incision appears to be healing well with no s/s infection. Patient had hysterectomy. No breast or nipple pain; pumping independently. No support person present at bedside during the shift; patient goes to visit baby often in NICU. Education provided on Plan of Care. Continue with plan of care.

## 2022-05-01 VITALS
DIASTOLIC BLOOD PRESSURE: 72 MMHG | OXYGEN SATURATION: 99 % | HEART RATE: 80 BPM | TEMPERATURE: 98 F | SYSTOLIC BLOOD PRESSURE: 107 MMHG | RESPIRATION RATE: 16 BRPM

## 2022-05-01 PROCEDURE — 250N000011 HC RX IP 250 OP 636: Performed by: STUDENT IN AN ORGANIZED HEALTH CARE EDUCATION/TRAINING PROGRAM

## 2022-05-01 PROCEDURE — 250N000013 HC RX MED GY IP 250 OP 250 PS 637: Performed by: STUDENT IN AN ORGANIZED HEALTH CARE EDUCATION/TRAINING PROGRAM

## 2022-05-01 RX ADMIN — ACETAMINOPHEN 650 MG: 325 TABLET ORAL at 05:01

## 2022-05-01 RX ADMIN — ACETAMINOPHEN 650 MG: 325 TABLET ORAL at 09:54

## 2022-05-01 RX ADMIN — SIMETHICONE 80 MG: 80 TABLET, CHEWABLE ORAL at 05:00

## 2022-05-01 RX ADMIN — POLYETHYLENE GLYCOL 3350 17 G: 17 POWDER, FOR SOLUTION ORAL at 08:08

## 2022-05-01 RX ADMIN — IBUPROFEN 600 MG: 600 TABLET ORAL at 05:01

## 2022-05-01 RX ADMIN — ENOXAPARIN SODIUM 40 MG: 40 INJECTION SUBCUTANEOUS at 06:54

## 2022-05-01 RX ADMIN — ACETAMINOPHEN 650 MG: 325 TABLET ORAL at 00:09

## 2022-05-01 RX ADMIN — SENNOSIDES AND DOCUSATE SODIUM 2 TABLET: 8.6; 5 TABLET ORAL at 08:08

## 2022-05-01 NOTE — PLAN OF CARE
VSS and postpartum assessments WDL. Ambulating in room with steady gait and tolerating regular diet. Adams in place and draining adequate output. Breast pumping and visiting infant in NICU. Pain managed with tylenol and Ibuprofen.  resent and supportive.  Will continue with postpartum cares and education per plan of care.

## 2022-05-01 NOTE — PROGRESS NOTES
[facility] Obstetrics   Postpartum Progress Note    Name: Bhumi Johnson  : 1990  MRN: [id number]    S: Patient is resting comfortably, currently pumping. Pain is well controlled. No vaginal bleeding. Nguyen is still in place; will remain there for two weeks. Ambulating without dizziness.  No nausea or emesis overnight. Denies fevers/chills, headache, CP, SOB. Overall she Is feeling really well. Has been visiting baby in the NICU. Planning to room in today    O:  Patient Vitals for the past 24 hrs:   BP Temp Temp src Pulse Resp   22 0020 116/89 98  F (36.7  C) Oral 80 17   22 1655 110/68 97.9  F (36.6  C) Oral -- --   22 0834 114/64 98.2  F (36.8  C) Oral 90 16       Gen: NAD. Alert, oriented. Resting comfortably in bed.  CV: well perfused   Resp:  no increased work of breathing  Abd: Soft, appropriately tender, non-distended   Incision: c/d/i, no erythema or induration, no active drainage   : nguyen in place with clear yellow urine in the bag.   Ext: 1+ lower extremity edema bilaterally     UOP:   Intake/Output Summary (Last 24 hours) at 2022 0732  Last data filed at 2022 0650  Gross per 24 hour   Intake 240 ml   Output 2810 ml   Net -2570 ml   Clear yellow    Labs:   HGB  Recent Labs   Lab 22  1056 22  1810 22  1117 22  0450   HGB 9.4* 9.8* 11.9 10.0*  10.4*       A/P: Bhumi Johnson is a 32 year old  female, now POD#5 s/p RCCS, hysterectomy and bilateral salpingectomy and cystoscopy. Pregnancy was complicated by history of CS x3 suspected PAS and MVR with palpitations. Stable in the postoperative period.      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, s/p repair with reassuring intraop cystoscopy   - UOP has been excellent  - Per Gyn Onc plan is to leave nguyen in for full 14 days; higher risk of having to reinsert nguyen if removed prematurely   - Cystogram prior to removal plan 3D macrobid 100 BID after removal    Mitral valve  regurgitation  Palpitations  - Currently asymptomatic  - vitally stable and within normal limits   - Echo 4/21: not yet performed per pt; scheduled for May d/t covid  - Lovenox ppx (ord'd to start 4/27)    Covid in pregnancy   - Congestion is predominant symptom - no breathing difficulties   - Positive on 4/18  - on lovenox, will discharge with daily ASA    PPH  - Vitally stable, no tachycardia   - Hgb downtrended appropriately and plt stable   - Hgb 12.3 > 5000mL > (methergine, TXA) 5u pRBC, 2u FFP>11.2>10>11.9 > 9.8  - Plts 157>121>>145    Postoperative care  - Continue routine postoperative management  - Rh pos, Rubella immune  - Pain: Continue ibuprofen/Tylenol scheduled.  Oxycodone PRN for breakthrough.   - Heme: as above   - FEN/GI: regular diet, stool softeners PRN  - : Adams in for 2 weeks - as above   - Contraception: s/p hyst  - Breastfeeding  - Baby doing well    Dispo: anticipate discharge today vs tomorrow    Ariane Colorado MD  Ob/Gyn Resident, PGY-1  05/01/2022 8:00 AM   Appreciate Dr. Colorado's note above, patient also seen and examined by me. I agree with the note above.   Yael Dominguez MD

## 2022-05-01 NOTE — PLAN OF CARE
Goal Outcome Evaluation: vss, postpartum assessment WDL. Patient is taking tylenol and ibuprofen for pain. Patient is using breast pump independently. Patient visits infant in 11th floor NICU. Discharge and home med instructions discussed with patient, all questions answered. Patient knows how to do nguyen catheter care, flush catheter if it is not draining, change leg bag to standard bag at night. Patient will have a follow up appt for a cystogram in 2 weeks and a 6 week pp follow up with her provider. Patient will be discharged home this morning.

## 2022-05-01 NOTE — PLAN OF CARE
Patient taking Ibuprofen and Tylenol for pain control, incision site dry and open to air. Patient with Adams catheter intact and draining well. Patient independent with cares and able to do catheter cares, ambulates in room, pumping milk for . Will continue with plan of care.

## 2022-05-03 ENCOUNTER — PATIENT OUTREACH (OUTPATIENT)
Dept: ONCOLOGY | Facility: CLINIC | Age: 32
End: 2022-05-03
Payer: COMMERCIAL

## 2022-05-03 LAB
PATH REPORT.COMMENTS IMP SPEC: NORMAL
PATH REPORT.COMMENTS IMP SPEC: NORMAL
PATH REPORT.FINAL DX SPEC: NORMAL
PATH REPORT.GROSS SPEC: NORMAL
PATH REPORT.MICROSCOPIC SPEC OTHER STN: NORMAL
PATH REPORT.RELEVANT HX SPEC: NORMAL
PHOTO IMAGE: NORMAL

## 2022-05-03 NOTE — PROGRESS NOTES
Received call from Yajaira,  from [facility] stating pt was on the phone and asking about her post op appt.  Informed Yajaira to tell the pt that writer will call back later with follow up plan.    Dr Salgado notified and states pt should follow up with OB for post op/post partum visit.  Pt called and updated as noted and pt states she still has nguyen catheter that will stay in place for 14 days and then will need cystogram.  Pt thought she needed to see Dr Salgado for nguyen removal.  Pt informed that writer will notify Dr Salgado to clarify who is doing trial and void and scheduling cystogram and will call pt back in the morning.  Pt aware of plan.    Dr Salgado, please advise.    Carol Bloom, RN, BSN, OCN

## 2022-05-03 NOTE — LETTER
May 3, 2022      Bhumi Johnson  [id number] [address] [address]        Dear MsKaronBj Johnson,    We are writing to inform you of your test results.    {results letter list:[id number]}    No results found from the In Basket message.    If you have any questions or concerns, please call the clinic at the number listed above.       Sincerely,

## 2022-05-04 NOTE — PROGRESS NOTES
Dr Salgado notified with pt call and MD has been in contact with Resident Dr Ariane Colorado to coordinate care.    Meredith Decker MD Hickey, Brianna, MD 13 hours ago (9:48 PM)     CR    Great.  Thanks     Usually we need to make sure they are scheduled for the cystogram at the correct time and then she would see someone in clinic later in the day to remove the nguyen if the cystogram is ok     Pt called and updated as noted.  Pt states she has been scheduled for Cystogram on 5/10/2022.  Pt informed that she will need a clinic visit with OB later in the day to review scan and possible nguyen catheter removal. Writer will continue to follow to ensure this appt is made and pt has OB clinic contact information.  Pt thankful for the call and also has our clinic number in the mean time until she has further follow up arranged. Pt verbalized understanding of plan and writer will call back later today to confirm plans.    Carol Bloom, RN, BSN, OCN

## 2022-05-06 NOTE — PROGRESS NOTES
Writer received call from Bhumi seeking updates on an appointment for Tuesday afternoon. Bhumi was updated that Dr. Salgado's RNCC, Jackie, will return to clinic this afternoon. Bhumi would like a call back today if possible at [phone].    Eryn Cerda, RN, BSN, OCN  Nurse Care Coordinator  [facility]  P: [phone]     F: [phone]

## 2022-05-06 NOTE — PROGRESS NOTES
Return call to pt earlier but no answer.  Left pt detailed message stating that writer sent message to Dr Ariane Colorado about OB appt still not scheduled following cystogram on 5/10.  Pt informed that writer will continue to follow to make sure this is scheduled but instructed pt to call back with further questions.    Chart reviewed later and appt now scheduled with Dr Yogi Aguayo on 5/10/2022.    Carol Bloom RN, BSN, OCN

## 2022-05-10 ENCOUNTER — OFFICE VISIT (OUTPATIENT)
Dept: OBGYN | Facility: CLINIC | Age: 32
End: 2022-05-10
Attending: OBSTETRICS & GYNECOLOGY
Payer: COMMERCIAL

## 2022-05-10 ENCOUNTER — HOSPITAL ENCOUNTER (OUTPATIENT)
Dept: CT IMAGING | Facility: CLINIC | Age: 32
Discharge: HOME | End: 2022-05-10
Payer: COMMERCIAL

## 2022-05-10 VITALS — HEART RATE: 83 BPM | DIASTOLIC BLOOD PRESSURE: 69 MMHG | SYSTOLIC BLOOD PRESSURE: 104 MMHG

## 2022-05-10 DIAGNOSIS — N39.498 OTHER URINARY INCONTINENCE: ICD-10-CM

## 2022-05-10 DIAGNOSIS — Z98.890 HISTORY OF CYSTOSTOMY: Primary | ICD-10-CM

## 2022-05-10 DIAGNOSIS — O43.219 PLACENTA ACCRETA AFFECTING DELIVERY: ICD-10-CM

## 2022-05-10 LAB
ALBUMIN UR-MCNC: 10 MG/DL
APPEARANCE UR: ABNORMAL
BILIRUB UR QL STRIP: NEGATIVE
COLOR UR AUTO: ABNORMAL
GLUCOSE UR STRIP-MCNC: NEGATIVE MG/DL
HGB UR QL STRIP: ABNORMAL
KETONES UR STRIP-MCNC: NEGATIVE MG/DL
LEUKOCYTE ESTERASE UR QL STRIP: NEGATIVE
MUCOUS THREADS #/AREA URNS LPF: PRESENT /LPF
NITRATE UR QL: NEGATIVE
PH UR STRIP: 5.5 [PH] (ref 5–7)
RBC URINE: 7 /HPF
SP GR UR STRIP: 1.02 (ref 1–1.03)
SQUAMOUS EPITHELIAL: <1 /HPF
URATE CRY #/AREA URNS HPF: ABNORMAL /HPF
UROBILINOGEN UR STRIP-MCNC: NORMAL MG/DL
WBC URINE: 1 /HPF

## 2022-05-10 PROCEDURE — 81001 URINALYSIS AUTO W/SCOPE: CPT | Performed by: OBSTETRICS & GYNECOLOGY

## 2022-05-10 PROCEDURE — 99024 POSTOP FOLLOW-UP VISIT: CPT | Performed by: OBSTETRICS & GYNECOLOGY

## 2022-05-10 PROCEDURE — G0463 HOSPITAL OUTPT CLINIC VISIT: HCPCS | Mod: 25

## 2022-05-10 PROCEDURE — 51702 INSERT TEMP BLADDER CATH: CPT

## 2022-05-10 PROCEDURE — 250N000011 HC RX IP 250 OP 636

## 2022-05-10 PROCEDURE — 72193 CT PELVIS W/DYE: CPT

## 2022-05-10 PROCEDURE — 72194 CT PELVIS W/O & W/DYE: CPT | Mod: 26 | Performed by: RADIOLOGY

## 2022-05-10 RX ORDER — IOPAMIDOL 755 MG/ML
25 INJECTION, SOLUTION INTRAVASCULAR ONCE
Status: COMPLETED | OUTPATIENT
Start: 2022-05-10 | End: 2022-05-10

## 2022-05-10 RX ADMIN — IOPAMIDOL 25 ML: 755 INJECTION, SOLUTION INTRAVENOUS at 08:04

## 2022-05-10 NOTE — PROCEDURES
Removed previous nguyen catheter and replaced with new nguyen catheter. Patient tolerated procedure well.

## 2022-05-10 NOTE — NURSING NOTE
Removed previous nguyen catheter, replaced with new nguyen catheter. Uncomplicated. Patient tolerated well.

## 2022-05-10 NOTE — PROGRESS NOTES
[facility]  Gynecology Problem Visit    SUBJECTIVE    Bhumi Johnson is a 32 year old  who is here for followup. She had a  hysterectomy c/b multiple cystotomies for PAS on . She had a CT urogram this morning.    Bhumi feels that her abdominal incision is healing well. When she sits on the toilet to defecate, she does leak a little urine around the nguyen. No pain with urination. No change to urine output.     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       ALLERGIES  No Known Allergies    REVIEW OF SYSTEMS  A 10 point review of systems including Constitutional, Eyes, Respiratory, Cardiovascular, Gastroenterology, Genitourinary, Integumentary, Musculoskeletal, and Psychiatric, were all negative, except for pertinent positives noted in the above HPI.    OBJECTIVE  /69   Pulse 83     General: Alert, without distress   Abdomen: soft, non-tender, non-distended, VML incision healing well without erythema/drainage/tenderness   Extremities: normal    ASSESSMENT  Bhumi Johnson is a 32 year old  who is here for followup after a c-hyst and cystotomy repair with a nguyen catheter in place.    PLAN  -given urinary  leakage, suspect this is from defecation, but will exchange nguyen to ensure no blockage and send UA to rule out infection  -plan repeat CT urogram in 2 weeks and followup with GYN ONC    Ana Travis MD, MSCI    [facility]/OBGYN  5/10/22

## 2022-05-10 NOTE — LETTER
5/10/2022       RE: Bhumi Johnson  [address] [address] [address]     Dear Colleague,    Thank you for referring your patient, Bhumi Johnson, to the [facility] [place] [facility]. Please see a copy of my visit note below.    [facility]  Gynecology Problem Visit    SUBJECTIVE    Bhumi Johnson is a 32 year old  who is here for followup. She had a  hysterectomy c/b multiple cystotomies for PAS on . She had a CT urogram this morning.    Bhumi feels that her abdominal incision is healing well. When she sits on the toilet to defecate, she does leak a little urine around the nguyen. No pain with urination. No change to urine output.     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       ALLERGIES  No Known Allergies    REVIEW OF SYSTEMS  A 10 point review of systems including Constitutional, Eyes, Respiratory, Cardiovascular, Gastroenterology, Genitourinary, Integumentary, Musculoskeletal, and Psychiatric, were all negative, except for pertinent positives noted in the above HPI.    OBJECTIVE  /69   Pulse 83     General: Alert,  without distress   Abdomen: soft, non-tender, non-distended, VML incision healing well without erythema/drainage/tenderness   Extremities: normal    ASSESSMENT  Bhumi Johnson is a 32 year old  who is here for followup after a c-hyst and cystotomy repair with a nguyen catheter in place.    PLAN  -given urinary leakage, suspect this is from defecation, but will exchange nguyen to ensure no blockage and send UA to rule out infection  -plan repeat CT urogram in 2 weeks and followup with GYN ONC    Ana Travis MD, MSCI    [facility]/OBGYN  5/10/22

## 2022-05-13 ENCOUNTER — PATIENT OUTREACH (OUTPATIENT)
Dept: ONCOLOGY | Facility: CLINIC | Age: 32
End: 2022-05-13
Payer: COMMERCIAL

## 2022-05-13 ENCOUNTER — TRANSCRIBE ORDERS (OUTPATIENT)
Dept: OTHER | Age: 32
End: 2022-05-13

## 2022-05-13 ENCOUNTER — TELEPHONE (OUTPATIENT)
Dept: OBGYN | Facility: CLINIC | Age: 32
End: 2022-05-13
Payer: COMMERCIAL

## 2022-05-13 DIAGNOSIS — N30.90 BLADDER INFECTION: Primary | ICD-10-CM

## 2022-05-13 NOTE — TELEPHONE ENCOUNTER
Situation: Mary from [facility] calling to report patient had visit with Ana Travis Women's health specialist OB/Gyn for follow-up. Patient had CT urogram completed and still bladder not fully healed. Nguyen was exchanged and place again. Mary is calling asking to reach out to Dr. Salgado on when she would like patient to return for post-op visit/nguyen removal with her and if she would like another CT urogram prior to this visit.     Background:  hysterectomy, bilateral salpingectomy, repair of cystotomy x 3, ureterolysis, cystoscopy on 22    Assessment: Patient would like clarification on post-op appointment and when nguyen can be taken out in the future.     Response: Writer to update Dr. Salgado on when post-op visit is needed and if CT urogram should be performed prior to visit.     Jarad Daniels, RN, BSN.  RN Care Coordinator     [facility]   [phone]

## 2022-05-13 NOTE — TELEPHONE ENCOUNTER
----- Message from Ana Travis MD sent at 2022  6:26 PM CDT -----  Regarding: followup with gyn onc?  Hi team,    Bhumi had a complicated  hysterectomy with gyn oncology on . She had a bladder injury that they repaired and has had an indwelling nguyen since. Somehow, she saw me yesterday for followup of the nguyen, but the CT urogram showed the bladder wasn't healed so we left the nguyen in place.    I made a plan for her to have another CT urogram in 2 weeks, but could we help her get a followup with GYN ONC in 2 weeks instead, so they can sign off on removing her nguyen, since they did her surgery?    Thanks!!    Ana

## 2022-05-13 NOTE — TELEPHONE ENCOUNTER
Spoke with care coordinator at Gyn/Onc and asked to assist in scheduling patient.    He will reach out to Dr Salgado and to Bhumi to get her seen.

## 2022-05-24 ENCOUNTER — HOSPITAL ENCOUNTER (OUTPATIENT)
Dept: CT IMAGING | Facility: CLINIC | Age: 32
Discharge: HOME | End: 2022-05-24
Attending: OBSTETRICS & GYNECOLOGY | Admitting: OBSTETRICS & GYNECOLOGY
Payer: COMMERCIAL

## 2022-05-24 ENCOUNTER — ONCOLOGY VISIT (OUTPATIENT)
Dept: ONCOLOGY | Facility: CLINIC | Age: 32
End: 2022-05-24
Attending: OBSTETRICS & GYNECOLOGY
Payer: COMMERCIAL

## 2022-05-24 VITALS
BODY MASS INDEX: 24.7 KG/M2 | OXYGEN SATURATION: 98 % | HEIGHT: 66 IN | HEART RATE: 89 BPM | DIASTOLIC BLOOD PRESSURE: 63 MMHG | SYSTOLIC BLOOD PRESSURE: 96 MMHG | TEMPERATURE: 97.9 F | WEIGHT: 153.7 LBS | RESPIRATION RATE: 16 BRPM

## 2022-05-24 DIAGNOSIS — O43.219 PLACENTA ACCRETA AFFECTING DELIVERY: Primary | ICD-10-CM

## 2022-05-24 DIAGNOSIS — N39.498 OTHER URINARY INCONTINENCE: ICD-10-CM

## 2022-05-24 DIAGNOSIS — Z43.5 CYSTOSTOMY CARE (H): ICD-10-CM

## 2022-05-24 DIAGNOSIS — Z98.890 HISTORY OF CYSTOSTOMY: ICD-10-CM

## 2022-05-24 PROCEDURE — 99024 POSTOP FOLLOW-UP VISIT: CPT | Performed by: OBSTETRICS & GYNECOLOGY

## 2022-05-24 PROCEDURE — 250N000009 HC RX 250: Performed by: RADIOLOGY

## 2022-05-24 PROCEDURE — 74178 CT ABD&PLV WO CNTR FLWD CNTR: CPT

## 2022-05-24 PROCEDURE — G0463 HOSPITAL OUTPT CLINIC VISIT: HCPCS

## 2022-05-24 PROCEDURE — 250N000011 HC RX IP 250 OP 636: Performed by: RADIOLOGY

## 2022-05-24 RX ORDER — NITROFURANTOIN MACROCRYSTAL 100 MG
100 CAPSULE ORAL 4 TIMES DAILY
COMMUNITY

## 2022-05-24 RX ORDER — IOPAMIDOL 755 MG/ML
500 INJECTION, SOLUTION INTRAVASCULAR ONCE
Status: COMPLETED | OUTPATIENT
Start: 2022-05-24 | End: 2022-05-24

## 2022-05-24 RX ADMIN — IOPAMIDOL 120 ML: 755 INJECTION, SOLUTION INTRAVENOUS at 08:05

## 2022-05-24 RX ADMIN — SODIUM CHLORIDE 95 ML: 9 INJECTION, SOLUTION INTRAVENOUS at 08:05

## 2022-05-24 ASSESSMENT — PAIN SCALES - GENERAL: PAINLEVEL: NO PAIN (0)

## 2022-05-24 NOTE — PROGRESS NOTES
"Consult Notes on Referred Patient              RE: Bhumi Johnson  : 1990  YAEL: May 24, 2022      HPI:  Bhumi Johnson is 32 year old s/p  hysterectomy, salpingectomy for PAS and cystotomy.  She is doing well post-operatively.  She has minimal pain.  She denies vaginal bleeding.  She is having normal bowel movements.  Her son came home from the NICU about 2 weeks ago and is doing well.  She is breastfeeding which is going well.    22:  CT Urogram-No urinary extravasation demonstrated.      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    Right     Placenta accreta        Past Surgical History:  Past Surgical History:   Procedure Laterality Date      SECTION        SECTION  05/15/2018      SECTION  10/18/2019      SECTION N/A 2022    Procedure:  section, Hysterectomy, Bilateral Salphingectomy, Repair of Cystostomy and Cystoscopy;  Surgeon: Loreto Gutiérrez MD;  Location: [facility]     COLPOSCOPY  10/24/2011    Benign cervical epithelium     CYSTOSCOPY N/A 2022    Procedure: Cystoscopy;  Surgeon: Meredith Decker MD;  Location: [facility]     HYSTERECTOMY TOTAL ABDOMINAL  2022    Procedure: Hysterectomy, Bilateral Salpingectomy, Repair of Cystostomy and Cystoscopy;  Surgeon: Meredith Decker MD;  Location: [facility]     CO BREAST AUGMENTATION  2016     TONSILLECTOMY       US BREAST BIOPSY RT Right 2015    Fibroadenoma       Health Maintenance:  Last Pap Smear: No need for further pap smear exams    Last Mammogram: N/A    Last Colonoscopy: N/A         Physical Exam:   BP 96/63   Pulse 89   Temp 97.9  F (36.6  C) (Tympanic)   Resp 16   Ht 1.676 m (5' 6\")   Wt 69.7 kg (153 lb 11.2 oz)   SpO2 98%   BMI 24.81 kg/m    Body mass index is 24.81 kg/m .    General Appearance: healthy and alert, no distress     Gastrointestinal:      "  abdomen soft, non-tender, non-distended, no organomegaly or masses, incision well healed without erythema/induration or drainage.    Genitourinary: External genitalia and urethral meatus appears normal.  Vagina is smooth with a well healing vaginal cuff    Labs:  None      Assessment:    Bhumi Johnson is a 32 year old woman s/p  hysterectomy, salpingectomy and cystotomy repair.     A total of 10 minutes was spent on patient care today.       Plan:     1.)    S/p  hysterectomy, salpingectomy and cystotomy repair-CT urogram reviewed showing no extravasation.  She also passed her active void trial.  She was instructed to take the 3 day course of macrobid therapy.  We discussed that she no longer needs screening pap smears now that her cervix has been removed. She will plan further follow up with her local gynecologist and has a post-partum visit scheduled already.  She will return only if concerns arise.     2.) Genetic risk factors were assessed and the patient does not meet the qualifications for a referral.      3.) Labs and/or tests ordered include:  None.     4.) Health maintenance issues addressed today include pt is up to date.        Thank you for allowing us to participate in the care of your patient.         Sincerely,    Meredith Salgado MD  Gynecologic Oncology  [facility]       CC

## 2022-05-24 NOTE — NURSING NOTE
"Oncology Rooming Note    May 24, 2022 11:40 AM   Bhumi Johnson is a 32 year old female who presents for:    Chief Complaint   Patient presents with     Oncology Clinic Visit     Initial Vitals: BP 96/63   Pulse 89   Temp 97.9  F (36.6  C) (Tympanic)   Resp 16   Ht 1.676 m (5' 6\")   Wt 69.7 kg (153 lb 11.2 oz)   SpO2 98%   BMI 24.81 kg/m   Estimated body mass index is 24.81 kg/m  as calculated from the following:    Height as of this encounter: 1.676 m (5' 6\").    Weight as of this encounter: 69.7 kg (153 lb 11.2 oz). Body surface area is 1.8 meters squared.  No Pain (0) Comment: Data Unavailable   No LMP recorded.  Allergies reviewed: Yes  Medications reviewed: Yes    Medications: Medication refills not needed today.  Pharmacy name entered into [employer]: [facility] [address] [place] [address]    Clinical concerns: follow up       Roxanne Briones CMA              "

## 2022-05-24 NOTE — LETTER
2022         RE: Bhumi Johnson  [id number] [address] [id number]        Dear Colleague,    Thank you for referring your patient, Bhumi Johnson, to the [facility]. Please see a copy of my visit note below.    Consult Notes on Referred Patient              RE: Bhumi Johnson  : 1990  YAEL: May 24, 2022      HPI:  Bhumi Johnson is 32 year old s/p  hysterectomy, salpingectomy for PAS and cystotomy.  She is doing well post-operatively.  She has minimal pain.  She denies vaginal bleeding.  She is having normal bowel movements.  Her son came home from the NICU about 2 weeks ago and is doing well.  She is breastfeeding which is going well.    22:  CT Urogram-No urinary extravasation demonstrated.      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    Right     Placenta accreta        Past Surgical History:  Past Surgical History:   Procedure Laterality Date      SECTION        SECTION  05/15/2018      SECTION  10/18/2019      SECTION N/A 2022    Procedure:  section, Hysterectomy, Bilateral Salphingectomy, Repair of Cystostomy and Cystoscopy;  Surgeon: Loreto Gutiérrez MD;  [facility]     COLPOSCOPY  10/24/2011    Benign cervical epithelium     CYSTOSCOPY N/A 2022    Procedure: Cystoscopy;  Surgeon: Meredith Decker MD;  [facility]     HYSTERECTOMY TOTAL ABDOMINAL  2022    Procedure: Hysterectomy, Bilateral Salpingectomy, Repair of Cystostomy and Cystoscopy;  Surgeon: Meredith Decker MD;  [facility]     PA BREAST AUGMENTATION  2016     TONSILLECTOMY       US BREAST BIOPSY RT Right 2015    Fibroadenoma       Health Maintenance:  Last Pap Smear: No need for further pap smear exams    Last Mammogram: N/A    Last Colonoscopy: N/A         Physical  "Exam:   BP 96/63   Pulse 89   Temp 97.9  F (36.6  C) (Tympanic)   Resp 16   Ht 1.676 m (5' 6\")   Wt 69.7 kg (153 lb 11.2 oz)   SpO2 98%   BMI 24.81 kg/m    Body mass index is 24.81 kg/m .    General Appearance: healthy and alert, no distress     Gastrointestinal:       abdomen soft, non-tender, non-distended, no organomegaly or masses, incision well healed without erythema/induration or drainage.    Genitourinary: External genitalia and urethral meatus appears normal.  Vagina is smooth with a well healing vaginal cuff    Labs:  None      Assessment:    Bhumi Johnson is a 32 year old woman s/p  hysterectomy, salpingectomy and cystotomy repair.     A total of 10 minutes was spent on patient care today.       Plan:     1.)    S/p  hysterectomy, salpingectomy and cystotomy repair-CT urogram reviewed showing no extravasation.  She also passed her active void trial.  She was instructed to take the 3 day course of macrobid therapy.  We discussed that she no longer needs screening pap smears now that her cervix has been removed. She will plan further follow up with her local gynecologist and has a post-partum visit scheduled already.  She will return only if concerns arise.     2.) Genetic risk factors were assessed and the patient does not meet the qualifications for a referral.      3.) Labs and/or tests ordered include:  None.     4.) Health maintenance issues addressed today include pt is up to date.        Thank you for allowing us to participate in the care of your patient.         Sincerely,    Meredith Salgado MD  Gynecologic Oncology  [facility]       CC          Again, thank you for allowing me to participate in the care of your patient.        Sincerely,        Zack Salgado MD    "

## 2023-04-22 ENCOUNTER — HEALTH MAINTENANCE LETTER (OUTPATIENT)
Age: 33
End: 2023-04-22

## 2024-04-20 ENCOUNTER — HEALTH MAINTENANCE LETTER (OUTPATIENT)
Age: 34
End: 2024-04-20

## 2025-05-11 ENCOUNTER — HEALTH MAINTENANCE LETTER (OUTPATIENT)
Age: 35
End: 2025-05-11

## (undated) DEVICE — DRAPE LAVH/LAPAROSCOPY W/POUCH 29474

## (undated) DEVICE — SU PDS II 0 CTX 60" Z990G

## (undated) DEVICE — COVER CAMERA IN-LIGHT DISP LT-C02

## (undated) DEVICE — LINEN TOWEL PACK X5 5464

## (undated) DEVICE — SU VICRYL 0 TIE 54" J608H

## (undated) DEVICE — SU VICRYL 0 CT-1 CR 8X27" UND JJ41G

## (undated) DEVICE — GLOVE PROTEXIS W/NEU-THERA 6.5  2D73TE65

## (undated) DEVICE — TUBING SUCTION MEDI-VAC SOFT 3/16"X20' N520A

## (undated) DEVICE — SUCTION TIP YANKAUER W/O VENT K86

## (undated) DEVICE — ESU PENCIL W/HOLSTER E2350H

## (undated) DEVICE — SU VICRYL 0 CT-1 36" J346H

## (undated) DEVICE — WIPES FOLEY CARE SURESTEP PROVON DFC100

## (undated) DEVICE — ADH SKIN CLOSURE PREMIERPRO EXOFIN 1.0ML 3470

## (undated) DEVICE — DRAPE MAYO STAND 23X54 8337

## (undated) DEVICE — GUIDEWIRE SENSOR DUAL FLEX STR 0.035"X150CM M0066703080

## (undated) DEVICE — GLOVE PROTEXIS BLUE W/NEU-THERA 7.0  2D73EB70

## (undated) DEVICE — ESU GROUND PAD UNIVERSAL W/O CORD

## (undated) DEVICE — SPONGE LAP 12X12" X8425

## (undated) DEVICE — SU VICRYL 0 CT-1 27" UND J260H

## (undated) DEVICE — CATH TRAY FOLEY SURESTEP 16FR W/URINE MTR STATLK LF A303416A

## (undated) DEVICE — SU VICRYL 0 CT-1 27" J340H

## (undated) DEVICE — Device

## (undated) DEVICE — SU MONOCRYL 4-0 PS-2 18" UND Y496G

## (undated) DEVICE — PREP CHLORAPREP 26ML TINTED HI-LITE ORANGE 930815

## (undated) DEVICE — SU VICRYL 0 CT-2 CR 8X18" J727D

## (undated) DEVICE — DRAPE IOBAN C-SECTION W/POUCH 30X35" 6657

## (undated) DEVICE — SU CHROMIC 1 CTX 36" 905H

## (undated) DEVICE — SYR BULB IRRIG DOVER 60 ML LATEX FREE 67000

## (undated) DEVICE — SUCTION MANIFOLD NEPTUNE 2 SYS 4 PORT 0702-020-000

## (undated) DEVICE — TUBING IRRIG CYSTO/BLADDER SET 81" LF 2C4040

## (undated) DEVICE — TUBING SUCTION MEDI-VAC 1/4"X20' N620A

## (undated) DEVICE — SOL NACL 0.9% IRRIG 1000ML BOTTLE 2F7124

## (undated) DEVICE — SU SILK 2-0 SH 30" K833H

## (undated) DEVICE — SPONGE LAP 18X18" X8435

## (undated) DEVICE — SYR 10ML LL W/O NDL 302995

## (undated) DEVICE — LINEN TOWEL PACK X30 5481

## (undated) DEVICE — DRAPE WARMER 66X44" ORS-300

## (undated) DEVICE — SOL WATER IRRIG 1000ML BOTTLE 2F7114

## (undated) DEVICE — BLADE KNIFE SURG 10 371110

## (undated) DEVICE — SU VICRYL 3-0 SH 27" UND J416H

## (undated) DEVICE — LINEN GOWN LG 5406

## (undated) DEVICE — SU VICRYL 2-0 CT-1 27" UND J259H

## (undated) DEVICE — ESU LIGASURE IMPACT OPEN SEALER/DVDR CVD LG JAW LF4418

## (undated) DEVICE — PAD CHUX UNDERPAD 30X36" P3036C

## (undated) DEVICE — ESU ELEC BLADE 6" COATED E1450-6

## (undated) DEVICE — SU VICRYL 3-0 SH 27" J316H

## (undated) RX ORDER — FENTANYL CITRATE 50 UG/ML
INJECTION, SOLUTION INTRAMUSCULAR; INTRAVENOUS
Status: DISPENSED
Start: 2022-04-26

## (undated) RX ORDER — LIDOCAINE HYDROCHLORIDE 20 MG/ML
INJECTION, SOLUTION EPIDURAL; INFILTRATION; INTRACAUDAL; PERINEURAL
Status: DISPENSED
Start: 2022-04-26

## (undated) RX ORDER — GLYCOPYRROLATE 0.2 MG/ML
INJECTION INTRAMUSCULAR; INTRAVENOUS
Status: DISPENSED
Start: 2022-04-26

## (undated) RX ORDER — PROPOFOL 10 MG/ML
INJECTION, EMULSION INTRAVENOUS
Status: DISPENSED
Start: 2022-04-26

## (undated) RX ORDER — ONDANSETRON 2 MG/ML
INJECTION INTRAMUSCULAR; INTRAVENOUS
Status: DISPENSED
Start: 2022-04-26

## (undated) RX ORDER — DEXAMETHASONE SODIUM PHOSPHATE 4 MG/ML
INJECTION, SOLUTION INTRA-ARTICULAR; INTRALESIONAL; INTRAMUSCULAR; INTRAVENOUS; SOFT TISSUE
Status: DISPENSED
Start: 2022-04-26

## (undated) RX ORDER — FENTANYL CITRATE-0.9 % NACL/PF 10 MCG/ML
PLASTIC BAG, INJECTION (ML) INTRAVENOUS
Status: DISPENSED
Start: 2022-04-26

## (undated) RX ORDER — EPHEDRINE SULFATE 50 MG/ML
INJECTION, SOLUTION INTRAMUSCULAR; INTRAVENOUS; SUBCUTANEOUS
Status: DISPENSED
Start: 2022-04-26

## (undated) RX ORDER — GLYCOPYRROLATE 0.2 MG/ML
INJECTION, SOLUTION INTRAMUSCULAR; INTRAVENOUS
Status: DISPENSED
Start: 2022-04-26

## (undated) RX ORDER — CEFAZOLIN SODIUM 1 G/3ML
INJECTION, POWDER, FOR SOLUTION INTRAMUSCULAR; INTRAVENOUS
Status: DISPENSED
Start: 2022-04-26

## (undated) RX ORDER — ROCURONIUM BROMIDE 50 MG/5 ML
SYRINGE (ML) INTRAVENOUS
Status: DISPENSED
Start: 2022-04-26

## (undated) RX ORDER — CITRIC ACID/SODIUM CITRATE 334-500MG
SOLUTION, ORAL ORAL
Status: DISPENSED
Start: 2022-04-26

## (undated) RX ORDER — ACETAMINOPHEN 325 MG/1
TABLET ORAL
Status: DISPENSED
Start: 2022-04-26

## (undated) RX ORDER — HYDROMORPHONE HYDROCHLORIDE 1 MG/ML
INJECTION, SOLUTION INTRAMUSCULAR; INTRAVENOUS; SUBCUTANEOUS
Status: DISPENSED
Start: 2022-04-26

## (undated) RX ORDER — CALCIUM CHLORIDE 100 MG/ML
INJECTION INTRAVENOUS; INTRAVENTRICULAR
Status: DISPENSED
Start: 2022-04-26

## (undated) RX ORDER — BUPIVACAINE HYDROCHLORIDE 2.5 MG/ML
INJECTION, SOLUTION EPIDURAL; INFILTRATION; INTRACAUDAL
Status: DISPENSED
Start: 2022-04-26